# Patient Record
Sex: FEMALE | Race: WHITE | NOT HISPANIC OR LATINO | Employment: OTHER | URBAN - METROPOLITAN AREA
[De-identification: names, ages, dates, MRNs, and addresses within clinical notes are randomized per-mention and may not be internally consistent; named-entity substitution may affect disease eponyms.]

---

## 2018-04-10 ENCOUNTER — HISTORICAL (OUTPATIENT)
Dept: LAB | Facility: HOSPITAL | Age: 78
End: 2018-04-10

## 2018-04-14 LAB — FINAL CULTURE: NORMAL

## 2022-04-09 ENCOUNTER — HISTORICAL (OUTPATIENT)
Dept: ADMINISTRATIVE | Facility: HOSPITAL | Age: 82
End: 2022-04-09
Payer: MEDICARE

## 2022-04-29 VITALS
HEIGHT: 62 IN | DIASTOLIC BLOOD PRESSURE: 84 MMHG | OXYGEN SATURATION: 96 % | WEIGHT: 127 LBS | SYSTOLIC BLOOD PRESSURE: 132 MMHG | BODY MASS INDEX: 23.37 KG/M2

## 2022-05-05 ENCOUNTER — PATIENT OUTREACH (OUTPATIENT)
Dept: ADMINISTRATIVE | Facility: HOSPITAL | Age: 82
End: 2022-05-05
Payer: MEDICARE

## 2022-05-05 NOTE — PROGRESS NOTES
Pre-Visit Call   Since your last visit have you been hospitalized or treated in the emergency room or urgent care? NA  If yes: When, Where and Why? NA  Have you seen any other healthcare providers since your last visit with your Primary Care Provider? NA  If Yes: When and Who? NA  Tasks (Labs, Radiology, Medical Records)  Completed   Any Labs or Diagnostics since last visit? No    Quality Metrics Due   Cervical Cancer Screen: No  Colorectal Screen: No  Diabetes Eye Exam: No  Diabetes Hgb A1C: No  Diabetes Micro albumi: No    Types of Colorectal Screen      Additional Comments: Left message. Last MMG 4/23/14. Last bone density 4/23/14      Patient Reminders:  1. Bring Medication bottles with you to your appointment.   2. Take Blood pressure medicine at least one hour prior to appointment.

## 2022-05-25 ENCOUNTER — OFFICE VISIT (OUTPATIENT)
Dept: FAMILY MEDICINE | Facility: CLINIC | Age: 82
End: 2022-05-25
Payer: MEDICARE

## 2022-05-25 VITALS
OXYGEN SATURATION: 98 % | DIASTOLIC BLOOD PRESSURE: 78 MMHG | HEIGHT: 63 IN | SYSTOLIC BLOOD PRESSURE: 121 MMHG | BODY MASS INDEX: 20.98 KG/M2 | RESPIRATION RATE: 20 BRPM | HEART RATE: 66 BPM | WEIGHT: 118.38 LBS | TEMPERATURE: 98 F

## 2022-05-25 DIAGNOSIS — I10 PRIMARY HYPERTENSION: ICD-10-CM

## 2022-05-25 DIAGNOSIS — J30.2 SEASONAL ALLERGIES: ICD-10-CM

## 2022-05-25 DIAGNOSIS — Z13.31 POSITIVE DEPRESSION SCREENING: Primary | ICD-10-CM

## 2022-05-25 DIAGNOSIS — Z23 ENCOUNTER FOR ADMINISTRATION OF VACCINE: ICD-10-CM

## 2022-05-25 DIAGNOSIS — Z28.20 IMMUNIZATION NOT CARRIED OUT BECAUSE OF PATIENT DECISION: ICD-10-CM

## 2022-05-25 DIAGNOSIS — F33.41 RECURRENT MAJOR DEPRESSIVE DISORDER, IN PARTIAL REMISSION: ICD-10-CM

## 2022-05-25 DIAGNOSIS — Z00.00 MEDICARE ANNUAL WELLNESS VISIT, SUBSEQUENT: ICD-10-CM

## 2022-05-25 DIAGNOSIS — J41.0 SIMPLE CHRONIC BRONCHITIS: ICD-10-CM

## 2022-05-25 DIAGNOSIS — Z97.3 WEARS EYEGLASSES: ICD-10-CM

## 2022-05-25 DIAGNOSIS — Z71.89 ADVANCE DIRECTIVE DISCUSSED WITH PATIENT: ICD-10-CM

## 2022-05-25 DIAGNOSIS — M85.89 OSTEOPENIA OF MULTIPLE SITES: ICD-10-CM

## 2022-05-25 DIAGNOSIS — E78.2 MIXED HYPERLIPIDEMIA: ICD-10-CM

## 2022-05-25 DIAGNOSIS — K59.09 CHRONIC CONSTIPATION: ICD-10-CM

## 2022-05-25 PROBLEM — M85.80 OSTEOPENIA: Status: ACTIVE | Noted: 2022-05-25

## 2022-05-25 PROBLEM — J44.9 CHRONIC OBSTRUCTIVE PULMONARY DISEASE: Status: ACTIVE | Noted: 2022-05-25

## 2022-05-25 PROBLEM — E78.5 DYSLIPIDEMIA: Status: ACTIVE | Noted: 2022-05-25

## 2022-05-25 PROCEDURE — 3288F FALL RISK ASSESSMENT DOCD: CPT | Mod: CPTII,,, | Performed by: FAMILY MEDICINE

## 2022-05-25 PROCEDURE — 99497 ADVNCD CARE PLAN 30 MIN: CPT | Mod: ,,, | Performed by: FAMILY MEDICINE

## 2022-05-25 PROCEDURE — 1160F RVW MEDS BY RX/DR IN RCRD: CPT | Mod: CPTII,,, | Performed by: FAMILY MEDICINE

## 2022-05-25 PROCEDURE — 3077F PR MOST RECENT SYSTOLIC BLOOD PRESSURE >= 140 MM HG: ICD-10-PCS | Mod: CPTII,,, | Performed by: FAMILY MEDICINE

## 2022-05-25 PROCEDURE — 1126F PR PAIN SEVERITY QUANTIFIED, NO PAIN PRESENT: ICD-10-PCS | Mod: CPTII,,, | Performed by: FAMILY MEDICINE

## 2022-05-25 PROCEDURE — 1159F MED LIST DOCD IN RCRD: CPT | Mod: CPTII,,, | Performed by: FAMILY MEDICINE

## 2022-05-25 PROCEDURE — G0439 PR MEDICARE ANNUAL WELLNESS SUBSEQUENT VISIT: ICD-10-PCS | Mod: ,,, | Performed by: FAMILY MEDICINE

## 2022-05-25 PROCEDURE — 99497 PR ADVNCD CARE PLAN 30 MIN: ICD-10-PCS | Mod: ,,, | Performed by: FAMILY MEDICINE

## 2022-05-25 PROCEDURE — 1101F PT FALLS ASSESS-DOCD LE1/YR: CPT | Mod: CPTII,,, | Performed by: FAMILY MEDICINE

## 2022-05-25 PROCEDURE — 3079F DIAST BP 80-89 MM HG: CPT | Mod: CPTII,,, | Performed by: FAMILY MEDICINE

## 2022-05-25 PROCEDURE — 1126F AMNT PAIN NOTED NONE PRSNT: CPT | Mod: CPTII,,, | Performed by: FAMILY MEDICINE

## 2022-05-25 PROCEDURE — 3079F PR MOST RECENT DIASTOLIC BLOOD PRESSURE 80-89 MM HG: ICD-10-PCS | Mod: CPTII,,, | Performed by: FAMILY MEDICINE

## 2022-05-25 PROCEDURE — G0439 PPPS, SUBSEQ VISIT: HCPCS | Mod: ,,, | Performed by: FAMILY MEDICINE

## 2022-05-25 PROCEDURE — 3288F PR FALLS RISK ASSESSMENT DOCUMENTED: ICD-10-PCS | Mod: CPTII,,, | Performed by: FAMILY MEDICINE

## 2022-05-25 PROCEDURE — 1159F PR MEDICATION LIST DOCUMENTED IN MEDICAL RECORD: ICD-10-PCS | Mod: CPTII,,, | Performed by: FAMILY MEDICINE

## 2022-05-25 PROCEDURE — 1101F PR PT FALLS ASSESS DOC 0-1 FALLS W/OUT INJ PAST YR: ICD-10-PCS | Mod: CPTII,,, | Performed by: FAMILY MEDICINE

## 2022-05-25 PROCEDURE — 3077F SYST BP >= 140 MM HG: CPT | Mod: CPTII,,, | Performed by: FAMILY MEDICINE

## 2022-05-25 PROCEDURE — 1160F PR REVIEW ALL MEDS BY PRESCRIBER/CLIN PHARMACIST DOCUMENTED: ICD-10-PCS | Mod: CPTII,,, | Performed by: FAMILY MEDICINE

## 2022-05-25 RX ORDER — BUDESONIDE AND FORMOTEROL FUMARATE DIHYDRATE 160; 4.5 UG/1; UG/1
1 AEROSOL RESPIRATORY (INHALATION) 4 TIMES DAILY PRN
COMMUNITY
Start: 2022-05-02 | End: 2022-11-29 | Stop reason: SDUPTHER

## 2022-05-25 RX ORDER — LINACLOTIDE 145 UG/1
145 CAPSULE, GELATIN COATED ORAL DAILY
COMMUNITY
Start: 2022-03-22 | End: 2022-11-29 | Stop reason: SDUPTHER

## 2022-05-25 RX ORDER — CITALOPRAM 40 MG/1
40 TABLET, FILM COATED ORAL DAILY
COMMUNITY
Start: 2022-03-09 | End: 2022-11-29 | Stop reason: SDUPTHER

## 2022-05-25 RX ORDER — ATORVASTATIN CALCIUM 20 MG/1
20 TABLET, FILM COATED ORAL DAILY
COMMUNITY
Start: 2022-04-01 | End: 2022-11-29 | Stop reason: SDUPTHER

## 2022-05-25 NOTE — PROGRESS NOTES
Patient Name: Elizabeth Navarrete     Date of service:  5/25/22      Member ID: [id number] - (Managed Medicare)    YOB: 1940   Gender: female   Race: White      Ethnicity: Not  or /a   Medical Record Number: [id number]     Reason for Visit:   Chief Complaint   Patient presents with    Medicare AWV     No labs done      History of Present Illness: 83 yo WF in NAD, here for AWV without complaints.       Past Medical History:   Diagnosis Date    Asthma     Depression     Hyperlipidemia     IBS (irritable bowel syndrome)       Providers regularly involved with care (specialists/suppliers):   Patient Care Team:  Gerald Jean-Baptiste MD as PCP - General (Hospitalist)   Past Surgical History:   Procedure Laterality Date    BLADDER SUSPENSION      HYSTERECTOMY        Medication reconciliation completed.    Current Outpatient Medications   Medication Sig    atorvastatin (LIPITOR) 20 MG tablet Take 20 mg by mouth once daily.    citalopram (CELEXA) 40 MG tablet Take 40 mg by mouth once daily.    LINZESS 145 mcg Cap capsule Take 145 mcg by mouth once daily.    SYMBICORT 160-4.5 mcg/actuation HFAA Inhale 1 puff into the lungs 4 (four) times daily as needed.     No current facility-administered medications for this visit.      There are no discontinued medications.     Review of patient's allergies indicates:  No Known Allergies  Social History     Socioeconomic History    Marital status:     Number of children: 4   Tobacco Use    Smoking status: Never Smoker    Smokeless tobacco: Never Used   Substance and Sexual Activity    Alcohol use: Not Currently    Drug use: Never      Smoking Cessation:  Counseling given: Not Answered     Non-smoker     Diet: normal diet  High-risk lifestyle(s): none    Family History   Problem Relation Age of Onset    No Known Problems Mother     Cancer Father       Review of Systems:  Review of Systems   All other systems reviewed and are negative.     Vitals:  "   05/25/22 0957   BP: (!) 146/88   Pulse: 70   Resp: 20   Temp: 98.2 °F (36.8 °C)   TempSrc: Oral   SpO2: 98%   Weight: 53.7 kg (118 lb 6.4 oz)   Height: 5' 3" (1.6 m)   PainSc: 0-No pain      Body mass index is 20.97 kg/m².   Physical Exam:  Physical Exam  Vitals and nursing note reviewed.   Constitutional:       Appearance: Normal appearance. She is normal weight.   HENT:      Head: Normocephalic and atraumatic.      Right Ear: Tympanic membrane, ear canal and external ear normal.      Left Ear: Tympanic membrane, ear canal and external ear normal.      Nose: Nose normal.      Mouth/Throat:      Mouth: Mucous membranes are moist.      Pharynx: Oropharynx is clear.   Eyes:      Extraocular Movements: Extraocular movements intact.      Conjunctiva/sclera: Conjunctivae normal.      Pupils: Pupils are equal, round, and reactive to light.   Cardiovascular:      Rate and Rhythm: Normal rate and regular rhythm.      Pulses: Normal pulses.      Heart sounds: Normal heart sounds.   Pulmonary:      Effort: Pulmonary effort is normal.      Breath sounds: Normal breath sounds.   Abdominal:      General: Abdomen is flat. Bowel sounds are normal.      Palpations: Abdomen is soft.   Musculoskeletal:         General: Normal range of motion.      Cervical back: Normal range of motion and neck supple.   Skin:     General: Skin is warm and dry.   Neurological:      General: No focal deficit present.      Mental Status: She is alert and oriented to person, place, and time. Mental status is at baseline.   Psychiatric:         Mood and Affect: Mood normal.         Behavior: Behavior normal.         Thought Content: Thought content normal.         Judgment: Judgment normal.        How is your physical exam and mental status from last year? unchanged    Health Maintenance Complete:  The patient has no Health Maintenance topics of status Not Due   CANCER SCREENING    Breast Cancer Screening:  Screening not applicable - has aged past " screening recommendation    Prostate Cancer Screening:   PSA not applicable due to patient sex  No results found for: PSA, PSADIAG    Cervical Cancer Screening:  The patient has the following risk factor(s) for Cervical Cancer: has aged past screening recommendation    Colorectal Cancer Screening:   patient declined screening at this time - has aged past screening recommendation    Lung Cancer Screening:   The patient has the following risk factor(s) for Lung Cancer: patient declined screening at this time - no risk factors and has aged past screening recommendations  Social History     Tobacco Use   Smoking Status Never Smoker   Smokeless Tobacco Never Used        DISEASE-SPECIFIC MANAGEMENT - please fill in all appropriate dates for screening received; only ONE is needed to meet HEDIS measures under each section.    Diabetic nephropathy: screening not applicable  No results found for: ALBUMIN, LABMICR, CREATRANDUR, MICALBCREAT    Diabetic eye care: Screening not applicable    Diabetic foot exam: screening not applicable    Labs: Lab not applicable  No results found for: HGBA1C, HGBA1C     Immunizations:  Immunization History   Administered Date(s) Administered    COVID-19, vector-nr, rS-Ad26, PF ([facility]edyCasa) 03/05/2021, 03/05/2021    Influenza 10/15/2012    Influenza - High Dose - PF (65 years and older) 10/16/2015, 11/11/2016, 10/31/2019    Influenza - Quadrivalent - PF *Preferred* (6 months and older) 10/05/1998, 11/26/2002, 12/09/2003, 11/22/2004, 11/05/2008, 11/16/2009, 11/01/2011    Influenza - Trivalent - PF (ADULT) 10/05/1998, 11/26/2002, 12/09/2003, 11/22/2004, 11/05/2008, 11/16/2009, 11/01/2011, 10/24/2013, 11/08/2021    Pneumococcal Polysaccharide - 23 Valent 10/05/1998, 11/22/2004    Tdap 11/22/2004      Osteoporosis management in women who had a fracture: screening not applicable - Osteopenia, record/results from outside source requested    Osteoporosis Testing in Women (65-85 years old)  The  "patient has the following risk factor(s) for Bone Mineral Density: last DEXA > 1 yr  No results found for this or any previous visit.    Abdominal Aortic Aneurysm Screening:  The patient has the following risk factor(s) for an Abdominal Aortic Aneurysm: no risk factors for this patient  No results found for this or any previous visit.    Cardiovascular Screening:  The patient has the following risk factor(s) for Cardiovascular Disease: patient's blood pressure is uncontrolled and currently on lipid lowering medications  No results found for: CHOL, TRIG, HDL, LDLCALC, CHOLHDL, TOTALCHOLEST, NONHDLCHOL   Hepatitis C Virus Screening:  The patient has the following risk factor(s) for Hepatitis C: - N/A   No results found for: HEPCAB    SCREENING ASSESSMENTS  Pain screening. Level of pain patient is in on a daily basis  0   [] If pain, check box if evidence of pain management.    The following assessments were completed and results are noted below:  Timed Get Up and Go - 6 sec  Depression screening - 4 (already has Dx and takes medications)  Whisper Test - hears normally  Nutrition screening - normal diet  Cognitive function screening - see below  ADLs/Functional status assessment - independent, cares for   Physical Activity Questionnaire (PAQ) - ambulatory without aids  Functional/Cognitive Status: Disability Status - functional, independent, does not drive    Assessment Result Summary:     Summary      Get Up and Go - 6 sec    PHQ-2 Total Score: 4    Nutrition  -- normal diet      Cognitive Function Screening      MMSE  5 What time is it? (year, season, month, day, date)  5 Where are we? (State, county, town, hospital, floor)  3 3 object test.  5 serial 7s or world backwards  3  Objects above  2 point to two objects and ask for name  1 Repeat "no ifs ands or buts"  1 Write a sentence  1 Copy a design  3 Follow three step command  1 Read and obey the following: Close your eyes    Score - 19 - mild-moderate " cognitive dysfunction    Functional/Cognitive Status:  Independent, Cares for         Other assessments:  Fall risk assessment:   Fall Risk Assessment - Outpatient 5/25/2022   Mobility Status Ambulatory   Number of falls 0   Identified as fall risk 0      Urinary incontinence: Patient is not having problems with urinary incontinence  Medication review performed for potentially harmful drug-disease interactions in the elderly.  Patient is under the care of /Care Manager: no. Referral sent: no.    Advanced directives (Living Will)  During this visit, I engaged the patient in the advance care planning process.  The patient and I reviewed the role for advance directives and their purpose in directing future healthcare if the patients unable to speak for him/herself.  At this point in time, the patient does have full decision-making capacity. We discussed different extreme health states that she could experience, and reviewed what kind of medical care she would want in those situations. The patient communicated that if she were comatose and had little chance of a meaningful recovery, she would not want machines/life-sustaining treatments used.  The patient has not completed a living will to reflect these preferences.  The patient has not already designated a healthcare power of  to make decisions on her behalf. I spent a total of 5 minutes engaging the patient in this advance care planning discussion.    Diagnoses with ICD-10 code:    ICD-10-CM ICD-9-CM   1. Positive depression screening  Z13.31 796.4   2. Medicare annual wellness visit, subsequent  Z00.00 V70.0   3. Advance directive discussed with patient  Z71.89 V65.49   4. Primary hypertension  I10 401.9   5. Mixed hyperlipidemia  E78.2 272.2   6. Chronic constipation  K59.09 564.00   7. Simple chronic bronchitis  J41.0 491.0   8. Seasonal allergies  J30.2 477.9   9. Wears eyeglasses  Z97.3 V49.89   10. Encounter for administration of  vaccine  Z23 V05.9   11. Immunization not carried out because of patient decision  Z28.20 V64.09   12. Osteopenia of multiple sites  M85.89 733.90   13. Recurrent major depressive disorder, in partial remission  F33.41 296.35      Diagnoses with associated orders:  Problem List Items Addressed This Visit     Medicare annual wellness visit, subsequent    Relevant Orders    CBC Auto Differential    Comprehensive Metabolic Panel    Lipid Panel    Urinalysis, Reflex to Urine Culture Urine, Clean Catch    Chronic constipation    Chronic obstructive pulmonary disease    Hypertension    Relevant Orders    CBC Auto Differential    Comprehensive Metabolic Panel    Lipid Panel    Urinalysis, Reflex to Urine Culture Urine, Clean Catch    Mixed hyperlipidemia    Relevant Orders    Comprehensive Metabolic Panel    Lipid Panel    Immunization not carried out because of patient decision    Osteopenia    Recurrent major depressive disorder, in partial remission    Seasonal allergies    Wears eyeglasses    Encounter for administration of vaccine    Advance directive discussed with patient      Other Visit Diagnoses     Positive depression screening    -  Primary    I have reviewed the positive depression score which warrants active treatment with psychotherapy and/or medications.        Plan:  1.  Continue current medications  2.  Side effects and expected results discussed  3.  Diet and exercise as tolerated  4.  Nutritional support  5.  Health maintenance updated accordingly  6.  Call with increased complaints or concerns  7.  Refills as needed  8.  Surveillance labs for chronic medical conditions as ordered  9.  RTC 6 months for chronic condition surveillance visit   10. Declines vaccines  11. Declines additional screening     Screening/prevention plan for the next 10 years:  Goal of exercise is 150 minutes a week.   Encouraged to follow balanced diet with daily servings of fresh fruit and vegetables.  Make sure to schedule all  health maintenance appointments to achieve health care goals.   Annual check up is due every 12 months with your designated provider/care team.  Health Maintenance Due   Topic Date Due    Lipid Panel  Never done    DEXA Scan  Never done    Shingles Vaccine (1 of 2) Never done    Pneumococcal Vaccines (Age 65+) (2 - PCV) 11/22/2005    TETANUS VACCINE  11/22/2014    COVID-19 Vaccine (2 - Booster for Cole series) 04/30/2021      Follow-up: Follow up in about 6 months (around 11/25/2022), or 6 months and/or PRN, for RecRequ: DEXA (if available).   Patient Instructions:  There are no Patient Instructions on file for this visit.       I have used clinical judgement based on duration and functional status to consider definite necessity for treatment. Already taking citalopram.

## 2022-06-17 ENCOUNTER — HOSPITAL ENCOUNTER (EMERGENCY)
Facility: HOSPITAL | Age: 82
Discharge: HOME | End: 2022-06-17
Attending: STUDENT IN AN ORGANIZED HEALTH CARE EDUCATION/TRAINING PROGRAM
Payer: MEDICARE

## 2022-06-17 VITALS
WEIGHT: 140 LBS | HEIGHT: 66 IN | OXYGEN SATURATION: 96 % | RESPIRATION RATE: 16 BRPM | SYSTOLIC BLOOD PRESSURE: 132 MMHG | DIASTOLIC BLOOD PRESSURE: 79 MMHG | BODY MASS INDEX: 22.5 KG/M2 | TEMPERATURE: 97 F | HEART RATE: 68 BPM

## 2022-06-17 DIAGNOSIS — E86.0 DEHYDRATION: ICD-10-CM

## 2022-06-17 DIAGNOSIS — R53.1 WEAKNESS: ICD-10-CM

## 2022-06-17 DIAGNOSIS — N39.0 URINARY TRACT INFECTION WITHOUT HEMATURIA, SITE UNSPECIFIED: ICD-10-CM
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
## 2022-06-17 DIAGNOSIS — R42 DIZZINESS: Primary | ICD-10-CM

## 2022-06-17 LAB
ALBUMIN SERPL-MCNC: 4 GM/DL (ref 3.4–4.8)
ALBUMIN/GLOB SERPL: 1.3 RATIO (ref 1.1–2)
ALP SERPL-CCNC: 84 UNIT/L (ref 40–150)
ALT SERPL-CCNC: 11 UNIT/L (ref 0–55)
APPEARANCE UR: CLEAR
AST SERPL-CCNC: 20 UNIT/L (ref 5–34)
BASOPHILS # BLD AUTO: 0.04 X10(3)/MCL (ref 0–0.2)
BASOPHILS NFR BLD AUTO: 0.3 %
BILIRUB UR QL STRIP.AUTO: NEGATIVE MG/DL
BILIRUBIN DIRECT+TOT PNL SERPL-MCNC: 0.6 MG/DL
BUN SERPL-MCNC: 19.6 MG/DL (ref 9.8–20.1)
CALCIUM SERPL-MCNC: 9.9 MG/DL (ref 8.4–10.2)
CHLORIDE SERPL-SCNC: 98 MMOL/L (ref 98–107)
CO2 SERPL-SCNC: 26 MMOL/L (ref 23–31)
COLOR UR AUTO: YELLOW
CREAT SERPL-MCNC: 0.99 MG/DL (ref 0.55–1.02)
EOSINOPHIL # BLD AUTO: 0 X10(3)/MCL (ref 0–0.9)
EOSINOPHIL NFR BLD AUTO: 0 %
ERYTHROCYTE [DISTWIDTH] IN BLOOD BY AUTOMATED COUNT: 12.8 % (ref 11.5–17)
GLOBULIN SER-MCNC: 3 GM/DL (ref 2.4–3.5)
GLUCOSE SERPL-MCNC: 124 MG/DL (ref 82–115)
GLUCOSE UR QL STRIP.AUTO: NEGATIVE MG/DL
HCT VFR BLD AUTO: 44.3 % (ref 37–47)
HGB BLD-MCNC: 15.1 GM/DL (ref 12–16)
IMM GRANULOCYTES # BLD AUTO: 0.08 X10(3)/MCL (ref 0–0.02)
IMM GRANULOCYTES NFR BLD AUTO: 0.6 % (ref 0–0.43)
KETONES UR QL STRIP.AUTO: ABNORMAL MG/DL
LEUKOCYTE ESTERASE UR QL STRIP.AUTO: ABNORMAL UNIT/L
LYMPHOCYTES # BLD AUTO: 0.46 X10(3)/MCL (ref 0.6–4.6)
LYMPHOCYTES NFR BLD AUTO: 3.3 %
MCH RBC QN AUTO: 29.7 PG (ref 27–31)
MCHC RBC AUTO-ENTMCNC: 34.1 MG/DL (ref 33–36)
MCV RBC AUTO: 87.2 FL (ref 80–94)
MONOCYTES # BLD AUTO: 0.56 X10(3)/MCL (ref 0.1–1.3)
MONOCYTES NFR BLD AUTO: 4 %
NEUTROPHILS # BLD AUTO: 13 X10(3)/MCL (ref 2.1–9.2)
NEUTROPHILS NFR BLD AUTO: 91.8 %
NITRITE UR QL STRIP.AUTO: NEGATIVE
NRBC BLD AUTO-RTO: 0 %
PH UR STRIP.AUTO: 5.5 [PH]
PLATELET # BLD AUTO: 193 X10(3)/MCL (ref 130–400)
PMV BLD AUTO: 10.4 FL (ref 9.4–12.4)
POTASSIUM SERPL-SCNC: 3.7 MMOL/L (ref 3.5–5.1)
PROT SERPL-MCNC: 7 GM/DL (ref 5.8–7.6)
PROT UR QL STRIP.AUTO: ABNORMAL MG/DL
RBC # BLD AUTO: 5.08 X10(6)/MCL (ref 4.2–5.4)
RBC UR QL AUTO: ABNORMAL UNIT/L
SODIUM SERPL-SCNC: 137 MMOL/L (ref 136–145)
SP GR UR STRIP.AUTO: 1.02 (ref 1–1.03)
TROPONIN I SERPL-MCNC: <0.01 NG/ML (ref 0–0.04)
UROBILINOGEN UR STRIP-ACNC: 1 MG/DL
WBC # SPEC AUTO: 14.2 X10(3)/MCL (ref 4.5–11.5)

## 2022-06-17 PROCEDURE — 99285 EMERGENCY DEPT VISIT HI MDM: CPT | Mod: 25

## 2022-06-17 PROCEDURE — 93005 ELECTROCARDIOGRAM TRACING: CPT

## 2022-06-17 PROCEDURE — 82962 GLUCOSE BLOOD TEST: CPT

## 2022-06-17 PROCEDURE — 96374 THER/PROPH/DIAG INJ IV PUSH: CPT

## 2022-06-17 PROCEDURE — 63600175 PHARM REV CODE 636 W HCPCS: Performed by: PHYSICIAN ASSISTANT

## 2022-06-17 PROCEDURE — 36415 COLL VENOUS BLD VENIPUNCTURE: CPT | Performed by: STUDENT IN AN ORGANIZED HEALTH CARE EDUCATION/TRAINING PROGRAM

## 2022-06-17 PROCEDURE — 96361 HYDRATE IV INFUSION ADD-ON: CPT

## 2022-06-17 PROCEDURE — 25000003 PHARM REV CODE 250: Performed by: STUDENT IN AN ORGANIZED HEALTH CARE EDUCATION/TRAINING PROGRAM

## 2022-06-17 PROCEDURE — 84484 ASSAY OF TROPONIN QUANT: CPT | Performed by: PHYSICIAN ASSISTANT

## 2022-06-17 PROCEDURE — 81001 URINALYSIS AUTO W/SCOPE: CPT | Performed by: PHYSICIAN ASSISTANT

## 2022-06-17 PROCEDURE — 80053 COMPREHEN METABOLIC PANEL: CPT | Performed by: STUDENT IN AN ORGANIZED HEALTH CARE EDUCATION/TRAINING PROGRAM

## 2022-06-17 PROCEDURE — 85025 COMPLETE CBC W/AUTO DIFF WBC: CPT | Performed by: STUDENT IN AN ORGANIZED HEALTH CARE EDUCATION/TRAINING PROGRAM

## 2022-06-17 RX ORDER — NITROFURANTOIN 25; 75 MG/1; MG/1
100 CAPSULE ORAL 2 TIMES DAILY
Qty: 10 CAPSULE | Refills: 0 | Status: SHIPPED | OUTPATIENT
Start: 2022-06-17 | End: 2022-06-22

## 2022-06-17 RX ORDER — ONDANSETRON 2 MG/ML
4 INJECTION INTRAMUSCULAR; INTRAVENOUS
Status: COMPLETED | OUTPATIENT
Start: 2022-06-17 | End: 2022-06-17

## 2022-06-17 RX ORDER — NITROFURANTOIN 25; 75 MG/1; MG/1
100 CAPSULE ORAL 2 TIMES DAILY
Qty: 10 CAPSULE | Refills: 0 | Status: SHIPPED | OUTPATIENT
Start: 2022-06-17 | End: 2022-06-17 | Stop reason: SDUPTHER

## 2022-06-17 RX ADMIN — SODIUM CHLORIDE 1000 ML: 9 INJECTION, SOLUTION INTRAVENOUS at 07:06

## 2022-06-17 RX ADMIN — ONDANSETRON 4 MG: 2 INJECTION INTRAMUSCULAR; INTRAVENOUS at 06:06

## 2022-06-17 NOTE — ED TRIAGE NOTES
(C/o dizziness/fall today,  not on thinners. Hx vertigo. Pt had large BM on scene and vomited en route. Pt placed in ccollar, denies chest pain.

## 2022-06-17 NOTE — ED PROVIDER NOTES
Encounter Date: 6/17/2022    SCRIBE #1 NOTE: I, Michael Yasemin, am scribing for, and in the presence of,  Ranjith Vora IV, MD. I have scribed the following portions of the note - Other sections scribed: HPI, ROS, PE, EKG.       History     Chief Complaint   Patient presents with    Dizziness     C/o dizziness/fall today, denies hitting head or LOC, not on thinners. Hx vertigo. Pt had large BM on scene and vomited en route.      An 83 y/o female with a history of Vertigo, HLD, IBS, and Asthma presents to [facility] ED after a fall from earlier today. Per pt's daughter, the pt has had confusion, nausea, dizziness upon sitting up. Vomited on scene. Denies blood thinners, unknown LOC. Per pt's daughter, the pt's fall was not witnessed,   heard thump and came running into room. At this time, no complaints, denies headaches, chest pain, abdominal pain, fevers, chills    The history is provided by the patient.   Fall  The accident occurred today. The fall occurred in unknown circumstances. She fell from an unknown height. She landed on a hard floor. Point of impact: unknown. The pain is at a severity of 0/10. She was not ambulatory at the scene. There was no drug use involved in the accident. There was no alcohol use involved in the accident. Pertinent negatives include no neck pain, no back pain, no fever, no abdominal pain, no nausea, no vomiting, no hematuria and no headaches.     Review of patient's allergies indicates:  No Known Allergies  Past Medical History:   Diagnosis Date    Asthma     Depression     Hyperlipidemia     IBS (irritable bowel syndrome)      Past Surgical History:   Procedure Laterality Date    BLADDER SUSPENSION      HYSTERECTOMY       Family History   Problem Relation Age of Onset    No Known Problems Mother     Cancer Father      Social History     Tobacco Use    Smoking status: Never Smoker    Smokeless tobacco: Never Used   Substance Use Topics    Alcohol use: Not Currently     Drug use: Never     Review of Systems   Constitutional: Negative for chills and fever.   HENT: Negative for congestion, rhinorrhea and sore throat.    Eyes: Negative for visual disturbance.   Respiratory: Negative for cough and shortness of breath.    Cardiovascular: Negative for chest pain and leg swelling.   Gastrointestinal: Negative for abdominal pain, nausea and vomiting.   Genitourinary: Negative for dysuria, hematuria, vaginal bleeding and vaginal discharge.   Musculoskeletal: Negative for back pain, joint swelling and neck pain.   Skin: Negative for rash.   Neurological: Positive for dizziness (upon sitting up). Negative for weakness and headaches.   Psychiatric/Behavioral: Negative for confusion.       Physical Exam     Initial Vitals [06/17/22 1447]   BP Pulse Resp Temp SpO2   131/79 70 18 96.8 °F (36 °C) 98 %      MAP       --         Physical Exam    Nursing note and vitals reviewed.  Constitutional: She is not diaphoretic. No distress.   HENT:   Head: Normocephalic and atraumatic.   Posterior scalp hematoma   Eyes: EOM are normal. Pupils are equal, round, and reactive to light.   Neck: Neck supple.   Normal range of motion.  Cardiovascular: Normal rate and regular rhythm.   No murmur heard.  Pulmonary/Chest: Breath sounds normal. No respiratory distress. She has no wheezes. She has no rales.   Abdominal: Abdomen is soft. She exhibits no distension. There is no abdominal tenderness.   Musculoskeletal:         General: Normal range of motion.      Cervical back: Normal range of motion and neck supple.     Neurological: She is alert and oriented to person, place, and time. She has normal strength.   Skin: Skin is warm. No rash noted.   Psychiatric: She has a normal mood and affect.         ED Course   Procedures  Labs Reviewed   COMPREHENSIVE METABOLIC PANEL - Abnormal; Notable for the following components:       Result Value    Glucose Level 124 (*)     All other components within normal limits   CBC  WITH DIFFERENTIAL - Abnormal; Notable for the following components:    WBC 14.2 (*)     Lymph # 0.46 (*)     Neut # 13.0 (*)     IG# 0.08 (*)     IG% 0.6 (*)     All other components within normal limits   URINALYSIS, REFLEX TO URINE CULTURE - Abnormal; Notable for the following components:    Protein, UA Trace (*)     Ketones, UA Trace (*)     Blood, UA 1+ (*)     Leukocyte Esterase, UA 2+ (*)     All other components within normal limits   URINALYSIS, MICROSCOPIC - Abnormal; Notable for the following components:    RBC, UA 6 (*)     WBC, UA 35 (*)     Mucous, UA Small (*)     All other components within normal limits   TROPONIN I - Normal   CULTURE, URINE   CBC W/ AUTO DIFFERENTIAL    Narrative:     The following orders were created for panel order CBC auto differential.  Procedure                               Abnormality         Status                     ---------                               -----------         ------                     CBC with Differential[[id number]]        Abnormal            Final result                 Please view results for these tests on the individual orders.   POCT GLUCOSE MONITORING CONTINUOUS     EKG Readings: (Independently Interpreted)   Rhythm: Normal Sinus Rhythm. Heart Rate: 77. Ectopy: No Ectopy. Conduction: RBBB. ST Segments: Normal ST Segments. T Waves Flipped: V2 and V3. Axis: Normal.   Time: 1828     ECG Results          EKG and show to ED MD (In process)  Result time 06/17/22 20:04:59    In process by Interface, Lab In [facility] (06/17/22 20:04:59)                 Narrative:    Test Reason : R42,    Vent. Rate : 077 BPM     Atrial Rate : 077 BPM     P-R Int : 140 ms          QRS Dur : 116 ms      QT Int : 422 ms       P-R-T Axes : 088 076 -15 degrees     QTc Int : 477 ms    Normal sinus rhythm  Right bundle branch block  T wave abnormality, consider inferolateral ischemia  Abnormal ECG  No previous ECGs available    Referred By: [email or web address]   SELF           Confirmed By:                              Imaging Results          X-Ray Chest 1 View (Final result)  Result time 06/17/22 19:45:21    Final result by Cam Romero MD (06/17/22 19:45:21)                 Impression:      NO ACUTE CARDIOPULMONARY PROCESS IDENTIFIED.      Electronically signed by: Cam Romero  Date:    06/17/2022  Time:    19:45             Narrative:    EXAMINATION:  XR CHEST 1 VIEW    CLINICAL HISTORY:  Weakness    TECHNIQUE:  One view    COMPARISON:  November 29, 2018.    FINDINGS:  Cardiopericardial silhouette is within normal limits. Lungs are without dense focal or segmental consolidation, congestive process, pleural effusions or pneumothorax.                               CT Cervical Spine Without Contrast (Final result)  Result time 06/17/22 19:12:09    Final result by Cam Romero MD (06/17/22 19:12:09)                 Impression:      No acute fracture or malalignment identified.      Electronically signed by: Cam Romero  Date:    06/17/2022  Time:    19:12             Narrative:    EXAMINATION:  CT CERVICAL SPINE WITHOUT CONTRAST    CLINICAL HISTORY:  Trauma.    TECHNIQUE:  Multidetector axial images were performed of the cervical spine without and.  Images were reconstructed.    Automated exposure control was utilized to minimize radiation dose.  DLP 1185.    COMPARISON:    None available.    FINDINGS:  There is grade 1 anterolisthesis of C4 on C5 which is degenerative is, cervical vertebrae stature alignment is preserved.  No acute fracture or malalignment identified.  There are degenerative changes which cause ventral impression upon the thecal sac and narrowings of the neural foramen.  There is no prevertebral soft tissue prominence.    This study does not exclude the possibility of intrathecal soft tissue, ligamentous or vascular injury.                               CT Head Without Contrast (Final result)  Result time 06/17/22 19:08:31    Final result by Cam Romero MD (06/17/22  19:08:31)                 Impression:      No acute intracranial traumatic findings identified.      Electronically signed by: Cam Romero  Date:    06/17/2022  Time:    19:08             Narrative:    EXAMINATION:  CT HEAD WITHOUT CONTRAST    CLINICAL HISTORY:  fall, dizziness, vomiting;    TECHNIQUE:  Sequential axial images were performed of the brain without contrast.    Dose product length of 1185 mGycm. Automated exposure control was utilized to minimize radiation dose.    COMPARISON:  None available.    FINDINGS:  There is no intracranial mass effect, midline shift, hydrocephalus or hemorrhage. There is no sulcal effacement or low attenuation changes to suggest recent large vessel territory infarction. Chronic appearing periventricular and subcortical white matter low attenuation changes are present and are consistent with chronic microangiopathic ischemia. The ventricular system and sulcal markings prominence is consistent with atrophy. There is no acute extra axial fluid collection.  There is no acute depressed calvarial fracture.    Bilateral with thickening of the maxillary and ethmoidal air cells.  Mastoid air cells aeration is optimal.                              X-Rays:   Independently Interpreted Readings:   Chest X-Ray: No acute abnormalities.   Head CT: No hemorrhage.     Medications   ondansetron injection 4 mg (4 mg Intravenous Given 6/17/22 1815)   sodium chloride 0.9% bolus 1,000 mL (0 mLs Intravenous Stopped 6/17/22 2000)     Medical Decision Making:   History:   I obtained history from: someone other than patient.  Old Medical Records: I decided to obtain old medical records.  Initial Assessment:   Dizzy, hx of vertigo, fell at home. Orthostatics positive here. Trauma workup unremarkable. Hydrated, UA with UTI. WIll treat, discharge with daughter.   Differential Diagnosis:   Dizziness, vertigo, dehydration, ICH, ACS  Independently Interpreted Test(s):   I have ordered and independently  interpreted EKG Reading(s) - see prior notes  Clinical Tests:   Lab Tests: Ordered and Reviewed  Radiological Study: Ordered and Reviewed  Medical Tests: Ordered and Reviewed  ED Management:  IVF            Scribe Attestation:   Scribe #1: I performed the above scribed service and the documentation accurately describes the services I performed. I attest to the accuracy of the note.    Attending Attestation:           Physician Attestation for Scribe:  Physician Attestation Statement for Scribe #1: I, Ranjith Vora IV, MD, reviewed documentation, as scribed by Michael Hazel in my presence, and it is both accurate and complete.             ED Course as of 06/18/22 0127   Fri Jun 17, 2022   2334 Labs with UTI - will treat, discharge with daughter [AC]   2335 Urine culture sent, other workup unremarkable.  [AC]      ED Course User Index  [AC] Ranjith Vora IV, MD             Clinical Impression:   Final diagnoses:  [R42] Dizziness (Primary)  [R53.1] Weakness  [W19.XXXA] Fall, initial encounter  [N39.0] Urinary tract infection without hematuria, site unspecified  [E86.0] Dehydration          ED Disposition Condition    Discharge Stable        ED Prescriptions     Medication Sig Dispense Start Date End Date Auth. Provider    nitrofurantoin, macrocrystal-monohydrate, (MACROBID) 100 MG capsule  (Status: Discontinued) Take 1 capsule (100 mg total) by mouth 2 (two) times daily. for 5 days 10 capsule 6/17/2022 6/17/2022 Ranjith Vora IV, MD    nitrofurantoin, macrocrystal-monohydrate, (MACROBID) 100 MG capsule Take 1 capsule (100 mg total) by mouth 2 (two) times daily. for 5 days 10 capsule 6/17/2022 6/22/2022 Ranjith Vora IV, MD        Follow-up Information     Follow up With Specialties Details Why Contact Info    Gerald Jean-Baptiste MD Family Medicine Schedule an appointment as soon as possible for a visit in 1 day  [address]  [facility] [address]  [phone]      [facility] -  Emergency Dept Emergency Medicine Go to  If symptoms worsen [address] [address]  [phone]      IRanjith MD personally performed the history, PE, MDM, and procedures as documented above and agree with the scribe's documentation.        Ranjith Vora IV, MD  06/18/22 0127

## 2022-06-18 ENCOUNTER — HOSPITAL ENCOUNTER (OUTPATIENT)
Facility: HOSPITAL | Age: 82
Discharge: HOME | End: 2022-06-20
Attending: EMERGENCY MEDICINE | Admitting: INTERNAL MEDICINE
Payer: MEDICARE

## 2022-06-18 DIAGNOSIS — R07.9 CHEST PAIN: ICD-10-CM

## 2022-06-18 DIAGNOSIS — R53.1 WEAKNESS: ICD-10-CM

## 2022-06-18 DIAGNOSIS — R53.1 GENERAL WEAKNESS: ICD-10-CM

## 2022-06-18 DIAGNOSIS — R11.2 INTRACTABLE NAUSEA AND VOMITING: Primary | ICD-10-CM

## 2022-06-18 LAB
BACTERIA #/AREA URNS AUTO: ABNORMAL /HPF
MUCOUS THREADS URNS QL MICRO: ABNORMAL /LPF
RBC #/AREA URNS AUTO: 6 /HPF
SQUAMOUS #/AREA URNS AUTO: <4 /HPF
WBC #/AREA URNS AUTO: 35 /HPF

## 2022-06-18 PROCEDURE — 99285 EMERGENCY DEPT VISIT HI MDM: CPT | Mod: 25,CS

## 2022-06-19 PROBLEM — R11.0 NAUSEA: Status: ACTIVE | Noted: 2022-06-19

## 2022-06-19 PROBLEM — R42 DIZZINESS: Status: ACTIVE | Noted: 2022-06-19
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
## 2022-06-19 LAB
ALBUMIN SERPL-MCNC: 3.7 GM/DL (ref 3.4–4.8)
ALBUMIN/GLOB SERPL: 1.2 RATIO (ref 1.1–2)
ALP SERPL-CCNC: 73 UNIT/L (ref 40–150)
ALT SERPL-CCNC: 10 UNIT/L (ref 0–55)
APPEARANCE UR: CLEAR
AST SERPL-CCNC: 23 UNIT/L (ref 5–34)
BACTERIA #/AREA URNS AUTO: ABNORMAL /HPF
BACTERIA UR CULT: ABNORMAL
BASOPHILS # BLD AUTO: 0.06 X10(3)/MCL (ref 0–0.2)
BASOPHILS NFR BLD AUTO: 0.9 %
BILIRUB UR QL STRIP.AUTO: NEGATIVE MG/DL
BILIRUBIN DIRECT+TOT PNL SERPL-MCNC: 0.8 MG/DL
BNP BLD-MCNC: 128.1 PG/ML
BUN SERPL-MCNC: 13.9 MG/DL (ref 9.8–20.1)
CALCIUM SERPL-MCNC: 9.3 MG/DL (ref 8.4–10.2)
CHLORIDE SERPL-SCNC: 102 MMOL/L (ref 98–107)
CO2 SERPL-SCNC: 25 MMOL/L (ref 23–31)
COLOR UR AUTO: YELLOW
CREAT SERPL-MCNC: 0.75 MG/DL (ref 0.55–1.02)
EOSINOPHIL # BLD AUTO: 0 X10(3)/MCL (ref 0–0.9)
EOSINOPHIL NFR BLD AUTO: 0 %
ERYTHROCYTE [DISTWIDTH] IN BLOOD BY AUTOMATED COUNT: 12.6 % (ref 11.5–17)
GLOBULIN SER-MCNC: 3.2 GM/DL (ref 2.4–3.5)
GLUCOSE SERPL-MCNC: 102 MG/DL (ref 82–115)
GLUCOSE UR QL STRIP.AUTO: NEGATIVE MG/DL
HCT VFR BLD AUTO: 41.9 % (ref 37–47)
HGB BLD-MCNC: 14.2 GM/DL (ref 12–16)
IMM GRANULOCYTES # BLD AUTO: 0.01 X10(3)/MCL (ref 0–0.02)
IMM GRANULOCYTES NFR BLD AUTO: 0.2 % (ref 0–0.43)
KETONES UR QL STRIP.AUTO: ABNORMAL MG/DL
LEUKOCYTE ESTERASE UR QL STRIP.AUTO: NEGATIVE UNIT/L
LYMPHOCYTES # BLD AUTO: 0.78 X10(3)/MCL (ref 0.6–4.6)
LYMPHOCYTES NFR BLD AUTO: 12.3 %
MCH RBC QN AUTO: 29.8 PG (ref 27–31)
MCHC RBC AUTO-ENTMCNC: 33.9 MG/DL (ref 33–36)
MCV RBC AUTO: 87.8 FL (ref 80–94)
MONOCYTES # BLD AUTO: 0.29 X10(3)/MCL (ref 0.1–1.3)
MONOCYTES NFR BLD AUTO: 4.6 %
NEUTROPHILS # BLD AUTO: 5.2 X10(3)/MCL (ref 2.1–9.2)
NEUTROPHILS NFR BLD AUTO: 82 %
NITRITE UR QL STRIP.AUTO: NEGATIVE
NRBC BLD AUTO-RTO: 0 %
PH UR STRIP.AUTO: 6 [PH]
PLATELET # BLD AUTO: 161 X10(3)/MCL (ref 130–400)
PMV BLD AUTO: 9.8 FL (ref 9.4–12.4)
POTASSIUM SERPL-SCNC: 4.2 MMOL/L (ref 3.5–5.1)
PROT SERPL-MCNC: 6.9 GM/DL (ref 5.8–7.6)
PROT UR QL STRIP.AUTO: NEGATIVE MG/DL
RBC # BLD AUTO: 4.77 X10(6)/MCL (ref 4.2–5.4)
RBC #/AREA URNS AUTO: ABNORMAL /HPF
RBC UR QL AUTO: ABNORMAL UNIT/L
SARS-COV-2 RDRP RESP QL NAA+PROBE: NEGATIVE
SODIUM SERPL-SCNC: 140 MMOL/L (ref 136–145)
SP GR UR STRIP.AUTO: 1.02
SQUAMOUS #/AREA URNS AUTO: ABNORMAL /HPF
TROPONIN I SERPL-MCNC: 0.02 NG/ML (ref 0–0.04)
UROBILINOGEN UR STRIP-ACNC: 0.2 MG/DL
WBC # SPEC AUTO: 6.3 X10(3)/MCL (ref 4.5–11.5)
WBC #/AREA URNS AUTO: ABNORMAL /HPF

## 2022-06-19 PROCEDURE — 97162 PT EVAL MOD COMPLEX 30 MIN: CPT

## 2022-06-19 PROCEDURE — G0378 HOSPITAL OBSERVATION PER HR: HCPCS

## 2022-06-19 PROCEDURE — 85025 COMPLETE CBC W/AUTO DIFF WBC: CPT | Performed by: EMERGENCY MEDICINE

## 2022-06-19 PROCEDURE — 93005 ELECTROCARDIOGRAM TRACING: CPT

## 2022-06-19 PROCEDURE — 87635 SARS-COV-2 COVID-19 AMP PRB: CPT | Performed by: EMERGENCY MEDICINE

## 2022-06-19 PROCEDURE — 81001 URINALYSIS AUTO W/SCOPE: CPT | Performed by: INTERNAL MEDICINE

## 2022-06-19 PROCEDURE — 93010 EKG 12-LEAD: ICD-10-PCS | Mod: ,,, | Performed by: INTERNAL MEDICINE

## 2022-06-19 PROCEDURE — 83880 ASSAY OF NATRIURETIC PEPTIDE: CPT | Performed by: EMERGENCY MEDICINE

## 2022-06-19 PROCEDURE — 63600175 PHARM REV CODE 636 W HCPCS: Performed by: EMERGENCY MEDICINE

## 2022-06-19 PROCEDURE — 25000003 PHARM REV CODE 250: Performed by: EMERGENCY MEDICINE

## 2022-06-19 PROCEDURE — 96374 THER/PROPH/DIAG INJ IV PUSH: CPT

## 2022-06-19 PROCEDURE — 36415 COLL VENOUS BLD VENIPUNCTURE: CPT | Performed by: EMERGENCY MEDICINE

## 2022-06-19 PROCEDURE — 94761 N-INVAS EAR/PLS OXIMETRY MLT: CPT

## 2022-06-19 PROCEDURE — 80053 COMPREHEN METABOLIC PANEL: CPT | Performed by: EMERGENCY MEDICINE

## 2022-06-19 PROCEDURE — 93010 ELECTROCARDIOGRAM REPORT: CPT | Mod: ,,, | Performed by: INTERNAL MEDICINE

## 2022-06-19 PROCEDURE — 25000003 PHARM REV CODE 250: Performed by: INTERNAL MEDICINE

## 2022-06-19 PROCEDURE — 84484 ASSAY OF TROPONIN QUANT: CPT | Performed by: EMERGENCY MEDICINE

## 2022-06-19 RX ORDER — ACETAMINOPHEN 325 MG/1
650 TABLET ORAL EVERY 8 HOURS PRN
Status: DISCONTINUED | OUTPATIENT
Start: 2022-06-19 | End: 2022-06-20 | Stop reason: HOSPADM

## 2022-06-19 RX ORDER — ATORVASTATIN CALCIUM 10 MG/1
20 TABLET, FILM COATED ORAL DAILY
Status: DISCONTINUED | OUTPATIENT
Start: 2022-06-19 | End: 2022-06-20 | Stop reason: HOSPADM

## 2022-06-19 RX ORDER — TALC
6 POWDER (GRAM) TOPICAL NIGHTLY PRN
Status: DISCONTINUED | OUTPATIENT
Start: 2022-06-19 | End: 2022-06-20 | Stop reason: HOSPADM

## 2022-06-19 RX ORDER — SODIUM CHLORIDE 9 MG/ML
1000 INJECTION, SOLUTION INTRAVENOUS
Status: COMPLETED | OUTPATIENT
Start: 2022-06-19 | End: 2022-06-19

## 2022-06-19 RX ORDER — IBUPROFEN 400 MG/1
400 TABLET ORAL EVERY 6 HOURS PRN
Status: DISCONTINUED | OUTPATIENT
Start: 2022-06-19 | End: 2022-06-20 | Stop reason: HOSPADM

## 2022-06-19 RX ORDER — NITROFURANTOIN 25; 75 MG/1; MG/1
100 CAPSULE ORAL 2 TIMES DAILY
Status: DISCONTINUED | OUTPATIENT
Start: 2022-06-19 | End: 2022-06-20 | Stop reason: HOSPADM

## 2022-06-19 RX ORDER — NALOXONE HCL 0.4 MG/ML
0.02 VIAL (ML) INJECTION
Status: DISCONTINUED | OUTPATIENT
Start: 2022-06-19 | End: 2022-06-20 | Stop reason: HOSPADM

## 2022-06-19 RX ORDER — SODIUM CHLORIDE 0.9 % (FLUSH) 0.9 %
10 SYRINGE (ML) INJECTION
Status: DISCONTINUED | OUTPATIENT
Start: 2022-06-19 | End: 2022-06-20 | Stop reason: HOSPADM

## 2022-06-19 RX ORDER — SODIUM CHLORIDE 0.9 % (FLUSH) 0.9 %
10 SYRINGE (ML) INJECTION EVERY 12 HOURS PRN
Status: DISCONTINUED | OUTPATIENT
Start: 2022-06-19 | End: 2022-06-20 | Stop reason: HOSPADM

## 2022-06-19 RX ORDER — ONDANSETRON 2 MG/ML
4 INJECTION INTRAMUSCULAR; INTRAVENOUS
Status: COMPLETED | OUTPATIENT
Start: 2022-06-19 | End: 2022-06-19

## 2022-06-19 RX ORDER — ONDANSETRON 2 MG/ML
4 INJECTION INTRAMUSCULAR; INTRAVENOUS EVERY 8 HOURS PRN
Status: DISCONTINUED | OUTPATIENT
Start: 2022-06-19 | End: 2022-06-20 | Stop reason: HOSPADM

## 2022-06-19 RX ORDER — CITALOPRAM 20 MG/1
40 TABLET, FILM COATED ORAL DAILY
Status: DISCONTINUED | OUTPATIENT
Start: 2022-06-19 | End: 2022-06-20 | Stop reason: HOSPADM

## 2022-06-19 RX ADMIN — ONDANSETRON 4 MG: 2 INJECTION INTRAMUSCULAR; INTRAVENOUS at 02:06

## 2022-06-19 RX ADMIN — CITALOPRAM HYDROBROMIDE 40 MG: 20 TABLET ORAL at 02:06

## 2022-06-19 RX ADMIN — LINACLOTIDE 145 MCG: 145 CAPSULE, GELATIN COATED ORAL at 02:06

## 2022-06-19 RX ADMIN — NITROFURANTOIN 100 MG: 25; 75 CAPSULE ORAL at 02:06

## 2022-06-19 RX ADMIN — ATORVASTATIN CALCIUM 20 MG: 10 TABLET, FILM COATED ORAL at 02:06

## 2022-06-19 RX ADMIN — SODIUM CHLORIDE 1000 ML: 9 INJECTION, SOLUTION INTRAVENOUS at 05:06

## 2022-06-19 RX ADMIN — NITROFURANTOIN 100 MG: 25; 75 CAPSULE ORAL at 08:06

## 2022-06-19 NOTE — H&P
[facility]    [facility] Medicine History & Physical Examination       Patient Name: Elizabeth Navarrete  MRN: [id number]  Patient Class: OP- Observation   Admission Date: 6/18/2022   Admitting Physician: COLEEN Service   Length of Stay: 0  Attending Physician: Sammy Call MD  Primary Care Provider: Gerald Jean-Baptiste MD  Face-to-Face encounter date: 06/19/2022    Code Status: Full Code    Chief Complaint: Nausea and Abdominal Pain          HISTORY OF PRESENT ILLNESS:   Elizabeth Navarrete is a 82 y.o. female who  has a past medical history of Asthma, Depression, Hyperlipidemia, and IBS (irritable bowel syndrome).. The patient presented to [facility] on 6/18/2022 with a primary complaint of dizziness, nausea and abd pain. She had been evaluated in the ER 2 days ago after she became dizzy and fell at home. She was treated and released. However, here dizziness has persisted and it leads to nausea and abd pain. The symptoms started 2 days ago. The chart mentions a hx of vertigo but she says it has been some time since she had an episode. This am she is starting to feel better. The dizziness was triggered with movement and activity. So far today it hasn't occurred. She tolerated a liquid diet. She denies nausea and abd pain at this time. She has no other c/o.    PAST MEDICAL HISTORY:     Past Medical History:   Diagnosis Date    Asthma     Depression     Hyperlipidemia     IBS (irritable bowel syndrome)        PAST SURGICAL HISTORY:     Past Surgical History:   Procedure Laterality Date    BLADDER SUSPENSION      HYSTERECTOMY         ALLERGIES:   Patient has no known allergies.    FAMILY HISTORY:   family history includes Cancer in her father and son; No Known Problems in her mother.    SOCIAL HISTORY:     Social History     Tobacco Use    Smoking status: Never Smoker    Smokeless tobacco: Never Used   Substance Use Topics    Alcohol use: Not Currently        HOME MEDICATIONS:     Prior to  Admission medications    Medication Sig Start Date End Date Taking? Authorizing Provider   atorvastatin (LIPITOR) 20 MG tablet Take 20 mg by mouth once daily. 4/1/22   Historical Provider   citalopram (CELEXA) 40 MG tablet Take 40 mg by mouth once daily. 3/9/22   Historical Provider   LINZESS 145 mcg Cap capsule Take 145 mcg by mouth once daily. 3/22/22   Historical Provider   nitrofurantoin, macrocrystal-monohydrate, (MACROBID) 100 MG capsule Take 1 capsule (100 mg total) by mouth 2 (two) times daily. for 5 days 6/17/22 6/22/22  Ranjith Vora IV, MD   SYMBICORT 160-4.5 mcg/actuation HFAA Inhale 1 puff into the lungs 4 (four) times daily as needed. 5/2/22   Historical Provider       REVIEW OF SYSTEMS:   Except as documented, all other systems reviewed and negative   Review of Systems   Constitutional: Negative for fever.   Respiratory: Negative for shortness of breath.    Gastrointestinal: Negative for abdominal pain, diarrhea and vomiting.   Musculoskeletal: Positive for falls.   Neurological: Negative for headaches.         PHYSICAL EXAM:     VITAL SIGNS: 24 HRS MIN & MAX LAST   Temp  Min: 97.7 °F (36.5 °C)  Max: 98.8 °F (37.1 °C) 97.8 °F (36.6 °C)   BP  Min: 130/75  Max: 159/79 130/75   Pulse  Min: 74  Max: 82  82   Resp  Min: 18  Max: 20 20   SpO2  Min: 93 %  Max: 98 % (!) 93 %       General appearance: Frail appearing elderly female in no apparent distress.  HENT: Atraumatic head. Moist mucous membranes of oral cavity.  Eyes: Normal extraocular movements.   Neck: Supple. No LAD  Lungs: Clear to auscultation bilaterally. No wheezing present.   Heart: Regular rate and rhythm. S1 and S2 present with no murmurs/gallop/rub. No pedal edema. No JVD present.   Abdomen: Soft, non-distended, non-tender. No rebound tenderness/guarding. Bowel sounds are normal.   Extremities: No cyanosis, clubbing, or edema.  Skin: No Rash.   Neuro: Motor and sensory exams grossly intact. Good tone. Muscle strength 5/5 in all 4  extremities  Psych/mental status: Appropriate mood and affect. Responds appropriately to questions.     LABS AND IMAGING:     Recent Labs   Lab 06/17/22  1724 06/19/22  0010   WBC 14.2* 6.3   RBC 5.08 4.77   HGB 15.1 14.2   HCT 44.3 41.9   MCV 87.2 87.8   MCH 29.7 29.8   MCHC 34.1 33.9   RDW 12.8 12.6    161   MPV 10.4 9.8       Recent Labs   Lab 06/17/22  1724 06/19/22  0010    140   K 3.7 4.2   CO2 26 25   BUN 19.6 13.9   CREATININE 0.99 0.75   CALCIUM 9.9 9.3   ALBUMIN 4.0 3.7   ALKPHOS 84 73   ALT 11 10   AST 20 23   BILITOT 0.6 0.8       Microbiology Results (last 7 days)     ** No results found for the last 168 hours. **           X-Ray Chest 1 View  Narrative: EXAMINATION:  XR CHEST 1 VIEW    CLINICAL HISTORY:  Weakness    TECHNIQUE:  One view    COMPARISON:  November 29, 2018.    FINDINGS:  Cardiopericardial silhouette is within normal limits. Lungs are without dense focal or segmental consolidation, congestive process, pleural effusions or pneumothorax.  Impression: NO ACUTE CARDIOPULMONARY PROCESS IDENTIFIED.    Electronically signed by: Cam Romero  Date:    06/17/2022  Time:    19:45  CT Cervical Spine Without Contrast  Narrative: EXAMINATION:  CT CERVICAL SPINE WITHOUT CONTRAST    CLINICAL HISTORY:  Trauma.    TECHNIQUE:  Multidetector axial images were performed of the cervical spine without and.  Images were reconstructed.    Automated exposure control was utilized to minimize radiation dose.  DLP 1185.    COMPARISON:    None available.    FINDINGS:  There is grade 1 anterolisthesis of C4 on C5 which is degenerative is, cervical vertebrae stature alignment is preserved.  No acute fracture or malalignment identified.  There are degenerative changes which cause ventral impression upon the thecal sac and narrowings of the neural foramen.  There is no prevertebral soft tissue prominence.    This study does not exclude the possibility of intrathecal soft tissue, ligamentous or vascular  injury.  Impression: No acute fracture or malalignment identified.    Electronically signed by: Cam Romero  Date:    06/17/2022  Time:    19:12  CT Head Without Contrast  Narrative: EXAMINATION:  CT HEAD WITHOUT CONTRAST    CLINICAL HISTORY:  fall, dizziness, vomiting;    TECHNIQUE:  Sequential axial images were performed of the brain without contrast.    Dose product length of 1185 mGycm. Automated exposure control was utilized to minimize radiation dose.    COMPARISON:  None available.    FINDINGS:  There is no intracranial mass effect, midline shift, hydrocephalus or hemorrhage. There is no sulcal effacement or low attenuation changes to suggest recent large vessel territory infarction. Chronic appearing periventricular and subcortical white matter low attenuation changes are present and are consistent with chronic microangiopathic ischemia. The ventricular system and sulcal markings prominence is consistent with atrophy. There is no acute extra axial fluid collection.  There is no acute depressed calvarial fracture.    Bilateral with thickening of the maxillary and ethmoidal air cells.  Mastoid air cells aeration is optimal.  Impression: No acute intracranial traumatic findings identified.    Electronically signed by: Cam Romero  Date:    06/17/2022  Time:    19:08        __________________________________________________________________________  INPATIENT LIST OF MEDICATIONS     Scheduled Meds:   atorvastatin  20 mg Oral Daily    citalopram  40 mg Oral Daily    linaCLOtide  145 mcg Oral Daily    nitrofurantoin (macrocrystal-monohydrate)  100 mg Oral BID     Continuous Infusions:  PRN Meds:acetaminophen, ibuprofen, melatonin, naloxone, ondansetron, sodium chloride 0.9%, sodium chloride 0.9%          ASSESSMENT & PLAN:     Dizziness  Nausea  Weakness  Recent Fall  Hx of HLP  Hx IBS  UTI    Plan  Advance diet  Mobilize pt  Monitor overnight, if symptoms resolved dc home in am  Cont macrobid, pt started on  tx on prior ER visit      Sammy Call MD   06/19/2022

## 2022-06-19 NOTE — ED NOTES
Bed: 05  Expected date: 6/18/22  Expected time: 11:18 PM  Means of arrival:   Comments:  70 F UTI / Nausea / Re-route

## 2022-06-19 NOTE — PLAN OF CARE
Problem: Adult Inpatient Plan of Care  Goal: Plan of Care Review  Outcome: Ongoing, Progressing  Goal: Patient-Specific Goal (Individualized)  Outcome: Ongoing, Progressing  Goal: Absence of Hospital-Acquired Illness or Injury  Outcome: Ongoing, Progressing  Goal: Optimal Comfort and Wellbeing  Outcome: Ongoing, Progressing  Goal: Readiness for Transition of Care  Outcome: Ongoing, Progressing     Problem: Fall Injury Risk  Goal: Absence of Fall and Fall-Related Injury  Outcome: Ongoing, Progressing     Problem: COPD (Chronic Obstructive Pulmonary Disease) Comorbidity  Goal: Maintenance of COPD Symptom Control  Outcome: Ongoing, Progressing     Problem: Hypertension Comorbidity  Goal: Blood Pressure in Desired Range  Outcome: Ongoing, Progressing

## 2022-06-19 NOTE — PLAN OF CARE
Problem: Adult Inpatient Plan of Care  Goal: Plan of Care Review  Outcome: Ongoing, Not Progressing  Goal: Patient-Specific Goal (Individualized)  Outcome: Ongoing, Not Progressing  Goal: Absence of Hospital-Acquired Illness or Injury  Outcome: Ongoing, Not Progressing  Goal: Optimal Comfort and Wellbeing  Outcome: Ongoing, Not Progressing  Goal: Readiness for Transition of Care  Outcome: Ongoing, Not Progressing     Problem: Fall Injury Risk  Goal: Absence of Fall and Fall-Related Injury  Outcome: Ongoing, Not Progressing     Problem: Behavioral Health Comorbidity  Goal: Maintenance of Behavioral Health Symptom Control  Outcome: Ongoing, Not Progressing     Problem: COPD (Chronic Obstructive Pulmonary Disease) Comorbidity  Goal: Maintenance of COPD Symptom Control  Outcome: Ongoing, Not Progressing     Problem: Hypertension Comorbidity  Goal: Blood Pressure in Desired Range  Outcome: Ongoing, Not Progressing

## 2022-06-19 NOTE — HPI
Elizabeth Navarrete is a 82 y.o. female who  has a past medical history of Asthma, Depression, Hyperlipidemia, and IBS (irritable bowel syndrome).. The patient presented to [facility] on 6/18/2022 with a primary complaint of dizziness, nausea and abd pain. She had been evaluated in the ER 2 days ago after she became dizzy and fell at home. She was treated and released. However, here dizziness has persisted and it leads to nausea and abd pain. The symptoms started 2 days ago. The chart mentions a hx of vertigo but she says it has been some time since she had an episode. This am she is starting to feel better. The dizziness was triggered with movement and activity. So far today it hasn't occurred. She tolerated a liquid diet. She denies nausea and abd pain at this time. She has no other c/o.

## 2022-06-19 NOTE — PT/OT/SLP EVAL
"Physical Therapy Evaluation    Patient Name:  Elizabeth Navarrete   MRN:  [id number]    Recommendations:     Discharge Recommendations:      Discharge Equipment Recommendations: other (see comments) (pt refused RW and straight cane, but would benefit from use of an AD)   Barriers to discharge: dizziness, impaired safety with functional mobility, decreased family/caregiver support    Assessment:     Elizabeth Navarrete is a 82 y.o. female admitted with a medical diagnosis of Dizziness.  She presents with the following impairments/functional limitations:  impaired functional mobilty, decreased safety awareness, gait instability, impaired balance impaired balance, dizziness.    Rehab Prognosis: Good; patient would benefit from acute skilled PT services to address these deficits and reach maximum level of function.    Recent Surgery: * No surgery found *      Plan:     During this hospitalization, patient to be seen daily to address the identified rehab impairments via gait training, therapeutic activities, therapeutic exercises and progress toward the following goals:    · Plan of Care Expires:  06/24/22    Subjective     Chief Complaint: dizziness  Patient/Family Comments/goals: "take care of myself"  Pain/Comfort:  Pain Rating 1: 0/10    Patients cultural, spiritual, Denominational conflicts given the current situation:      Living Environment:  Home with ; pts  is unable to physically assist much.    3 steps to enter home without railings.    Prior to admission, patients level of function was independent per pt report; however, pt reports that she feels like she is at her baseline functional status.  Equipment used at home: none.     Objective:     Communicated with NSG prior to session.  Patient found supine with    upon PT entry to room.    General Precautions: Standard,     Orthopedic Precautions:    Braces:    Respiratory Status: Room air    Exams:  · RLE Strength: WFL  · LLE Strength: WFL    Functional " Mobility:  · Bed Mobility:     · Supine to Sit: independence  · Sit to Supine: independence  · Transfers:     · Sit to Stand:  supervision with no AD  · Bed to Chair: supervision with  no AD  using  Step Transfer  · Gait: 400' SBA/CGA without use of an AD. Occasional unsteadiness noted with scissoring gait at times and pt having to take an extra step to the side to correct LOBs.  · Stairs:  Pt ascended/descended 3 stair(s) x2 completions with SBA; encouraged to not use handrails but pt had to catch herself on R and L rails multiple times while performing stairs d/t LOBs.       AM-PAC 6 CLICK MOBILITY  Total Score:      Patient left supine with call button in reach.    GOALS:   Multidisciplinary Problems     Physical Therapy Goals        Problem: Physical Therapy    Goal Priority Disciplines Outcome Goal Variances Interventions   Physical Therapy Goal     PT, PT/OT      Description: Sit<>stand: Independent  Bed<>chair: Independent  Gait: 200' Independent  Stairs: 3 steps without railings Independent                           History:     Past Medical History:   Diagnosis Date    Asthma     Depression     Hyperlipidemia     IBS (irritable bowel syndrome)        Past Surgical History:   Procedure Laterality Date    BLADDER SUSPENSION      HYSTERECTOMY         Time Tracking:     PT Received On:    PT Start Time: 1300     PT Stop Time: 1320  PT Total Time (min): 20 min     Billable Minutes: Evaluation 20 min      06/19/2022

## 2022-06-19 NOTE — ED PROVIDER NOTES
Encounter Date: 6/18/2022       History     Chief Complaint   Patient presents with    Nausea    Abdominal Pain     Ms. Navarrete is an 82-year-old female with a history of vertigo, HLD, GERD, and asthma presents by way of ambulance [facility] complaining of a 2 day history of generalized weakness and severe nausea.  She was seen in the emergency room yesterday at [facility] and had an evaluation due to weakness and fall which included a CT head which was negative, CT C-spine which was negative, lab work which was benign other than UTI.  She was given nitrofurantoin and discharged home but she states she has been too weak and nauseated to eat or drink anything.      Nausea  This is a new problem. The current episode started 2 days ago. The problem occurs constantly. The problem has not changed since onset.Associated symptoms include abdominal pain. Nothing aggravates the symptoms. Nothing relieves the symptoms.   Abdominal Pain  The other symptoms of the illness include nausea.     Review of patient's allergies indicates:  No Known Allergies  Past Medical History:   Diagnosis Date    Asthma     Depression     Hyperlipidemia     IBS (irritable bowel syndrome)      Past Surgical History:   Procedure Laterality Date    BLADDER SUSPENSION      HYSTERECTOMY       Family History   Problem Relation Age of Onset    No Known Problems Mother     Cancer Father      Social History     Tobacco Use    Smoking status: Never Smoker    Smokeless tobacco: Never Used   Substance Use Topics    Alcohol use: Not Currently    Drug use: Never     Review of Systems   Gastrointestinal: Positive for abdominal pain and nausea.   Neurological: Positive for weakness.   All other systems reviewed and are negative.      Physical Exam     Initial Vitals [06/18/22 2339]   BP Pulse Resp Temp SpO2   (!) 159/93 74 18 97.7 °F (36.5 °C) 98 %      MAP       --         Physical Exam    Nursing  note and vitals reviewed.  Constitutional: She appears well-developed and well-nourished. She is not diaphoretic. No distress.   Frail appearing   HENT:   Head: Normocephalic and atraumatic.   Mouth/Throat: Oropharynx is clear and moist.   Eyes: Conjunctivae are normal. Pupils are equal, round, and reactive to light.   Neck: Neck supple.   Cardiovascular: Normal rate, regular rhythm, normal heart sounds and intact distal pulses.   Pulmonary/Chest: Breath sounds normal. No respiratory distress. She has no wheezes. She has no rhonchi. She has no rales.   Abdominal: Abdomen is soft. Bowel sounds are normal. She exhibits no distension. There is no abdominal tenderness. There is no guarding.   Musculoskeletal:         General: No tenderness or edema. Normal range of motion.      Cervical back: Neck supple.     Neurological: She is alert and oriented to person, place, and time.   Skin: Skin is warm and dry. Capillary refill takes less than 2 seconds. No rash noted.   Psychiatric: She has a normal mood and affect. Thought content normal.         ED Course   Procedures  Labs Reviewed   B-TYPE NATRIURETIC PEPTIDE - Abnormal; Notable for the following components:       Result Value    Natriuretic Peptide 128.1 (*)     All other components within normal limits   TROPONIN I - Normal   SARS-COV-2 RNA AMPLIFICATION, QUAL - Normal   CBC WITH DIFFERENTIAL   COMPREHENSIVE METABOLIC PANEL     EKG Readings: (Independently Interpreted)   Initial Reading: No STEMI. Rhythm: Normal Sinus Rhythm. Heart Rate: 77. Ectopy: No Ectopy. ST Segment Depression: V5. T Waves Flipped: III, AVF, V5 and V6. Clinical Impression: Normal Sinus Rhythm Other Impression: Nonspecific ST abnormality and inverted T-waves, EKG performed yesterday has a similar description but I am unable to view the images on epic       Imaging Results    None          Medications   sodium chloride 0.9% flush 10 mL (has no administration in time range)   melatonin tablet 6 mg  (has no administration in time range)   ondansetron injection 4 mg (has no administration in time range)   0.9%  NaCl infusion (has no administration in time range)   ondansetron injection 4 mg (4 mg Intravenous Given 6/19/22 0207)     Medical Decision Making:   Initial Assessment:   82-year-old female presents to the emergency room complaining of generalized weakness and nausea and has not been able to eat or drink anything since yesterday.  She was seen at [facility] yesterday due to weakness and fall and had a negative workup other than UTI.  Differential Diagnosis:   Deconditioning, UTI, electrolyte abnormality, dehydration, sepsis, failure to thrive  Clinical Tests:   Lab Tests: Reviewed  Radiological Study: Reviewed  ED Management:  Patient was given IV Zofran 4 mg x 2 and IV fluids.  She complains of continued nausea and is unable to stand due to generalized weakness.  She has no focal deficits or indication for repeat head CT.  I have discussed her care with her son who is present and he states that she cannot go home she can not walk as she needs to be able to walk at home with her walker.  Other:   I have discussed this case with another health care provider.       <> Summary of the Discussion: Case discussed with the physician on-call for the hospitalist service, Dr. Abdirashid Velázquez. Patient will be admitted to [facility] for observation, and further evaluation of her generalized weakness and nausea.             ED Course as of 06/19/22 0408   Sun Jun 19, 2022   0218 Patient continues to complain of severe nausea despite 2 doses of the Zofran.  We did attempt to stand her but she was unable to stand saying she was too weak.  She has no focal deficits her evaluation is relatively benign.  I will contact the hospitalist for observation. [SH]      ED Course User Index  [SH] Violet Uribe MD             Clinical Impression:   Final diagnoses:  [R53.1]  Weakness  [R11.2] Intractable nausea and vomiting (Primary)  [R53.1] General weakness          ED Disposition Condition    Observation               Violet Uribe MD  06/19/22 3421

## 2022-06-20 VITALS
BODY MASS INDEX: 23.53 KG/M2 | OXYGEN SATURATION: 95 % | DIASTOLIC BLOOD PRESSURE: 74 MMHG | HEIGHT: 62 IN | WEIGHT: 127.88 LBS | RESPIRATION RATE: 18 BRPM | HEART RATE: 66 BPM | SYSTOLIC BLOOD PRESSURE: 122 MMHG | TEMPERATURE: 99 F

## 2022-06-20 PROCEDURE — 25000003 PHARM REV CODE 250: Performed by: INTERNAL MEDICINE

## 2022-06-20 PROCEDURE — G0378 HOSPITAL OBSERVATION PER HR: HCPCS

## 2022-06-20 RX ORDER — MECLIZINE HYDROCHLORIDE 25 MG/1
25 TABLET ORAL 3 TIMES DAILY PRN
Qty: 30 TABLET | Refills: 0 | Status: SHIPPED | OUTPATIENT
Start: 2022-06-20 | End: 2023-06-22

## 2022-06-20 RX ADMIN — ATORVASTATIN CALCIUM 20 MG: 10 TABLET, FILM COATED ORAL at 08:06

## 2022-06-20 RX ADMIN — LINACLOTIDE 145 MCG: 145 CAPSULE, GELATIN COATED ORAL at 08:06

## 2022-06-20 RX ADMIN — CITALOPRAM HYDROBROMIDE 40 MG: 20 TABLET ORAL at 08:06

## 2022-06-20 RX ADMIN — NITROFURANTOIN 100 MG: 25; 75 CAPSULE ORAL at 08:06

## 2022-06-20 NOTE — DISCHARGE SUMMARY
"[facility]   [facility] Medicine Discharge Summary    Admit Date: 6/18/2022  Discharge Date and Time: 6/20/20223:27 PM  Admitting Physician: [unfilled]  Discharging Physician: Sammy Merritt MD.  Primary Care Physician: Gerald Jean-Baptiste MD  Consults (From admission, onward)        Status Ordering Provider     Inpatient consult to Social Work/Case Management  Once        Provider:  (Not yet assigned)    Ordered SAMMY MERRITT               Discharge Diagnoses:  Dizziness -resolved  Nausea -resolved  Weakness -improved  Recent Fall  Hx of HLP  Hx IBS  UTI     Plan  DC home with home health  Complete her couse of abx for UTI  Resume home meds.  RX for prn Antivert written  F/u with pcp in 2 weeks for checkup        HPI  Elizabeth Navarrete is a 82 y.o. female who  has a past medical history of Asthma, Depression, Hyperlipidemia, and IBS (irritable bowel syndrome).. The patient presented to [facility] on 6/18/2022 with a primary complaint of dizziness, nausea and abd pain. She had been evaluated in the ER 2 days ago after she became dizzy and fell at home. She was treated and released. However, here dizziness has persisted and it leads to nausea and abd pain. The symptoms started 2 days ago. The chart mentions a hx of vertigo but she says it has been some time since she had an episode. This am she is starting to feel better. The dizziness was triggered with movement and activity. So far today it hasn't occurred. She tolerated a liquid diet. She denies nausea and abd pain at this time. She has no other c/o.        Hospital Course:   The patient was monitored overnight. Her dizziness hasn't returned. She was evaluated by physical therapist yesterday. She declined therapy today. She said she was feeling "lazy". She walk to the bathroom with the CNA earlier. I personal had her get up and walk in the room with me. She didn't need any assistance and it didn't trigger a dizzy spell. She is " stable for dc home. She is in agreement. We will setup home deonte to for therapy to work with her at home.      Pt was seen and examined on the day of discharge  Vitals:  VITAL SIGNS: 24 HRS MIN & MAX LAST   Temp  Min: 97.9 °F (36.6 °C)  Max: 99.3 °F (37.4 °C) 97.9 °F (36.6 °C)   BP  Min: 99/63  Max: 157/90 (!) 157/90   Pulse  Min: 63  Max: 78  63   Resp  Min: 18  Max: 18 18   SpO2  Min: 92 %  Max: 97 % 97 %       Physical Exam:  General appearance: Elderly female in no apparent distress.  HENT: Atraumatic head.  Neck: Supple.   Lungs: Clear to auscultation bilaterally. No wheezing present.   Heart: Regular rate and rhythm. S1 and S2 present with no murmurs/gallop/rub. No pedal edema. No JVD present.   Abdomen: Soft, non-distended, non-tender. No rebound tenderness/guarding. Bowel sounds are normal.   Extremities: No cyanosis, clubbing, or edema.  Skin: No Rash.   Neuro: Motor and sensory exams grossly intact. Good tone. Muscle strength 5/5 in all 4 extremities. Normal gait.  Psych/mental status: Appropriate mood and affect. Responds appropriately to questions.       Procedures Performed: No admission procedures for hospital encounter.     Significant Diagnostic Studies: See Full reports for all details    Recent Labs   Lab 06/17/22  1724 06/19/22  0010   WBC 14.2* 6.3   RBC 5.08 4.77   HGB 15.1 14.2   HCT 44.3 41.9   MCV 87.2 87.8   MCH 29.7 29.8   MCHC 34.1 33.9   RDW 12.8 12.6    161   MPV 10.4 9.8       Recent Labs   Lab 06/17/22  1724 06/19/22  0010    140   K 3.7 4.2   CO2 26 25   BUN 19.6 13.9   CREATININE 0.99 0.75   CALCIUM 9.9 9.3   ALBUMIN 4.0 3.7   ALKPHOS 84 73   ALT 11 10   AST 20 23   BILITOT 0.6 0.8        Microbiology Results (last 7 days)     ** No results found for the last 168 hours. **           X-Ray Chest 1 View  Narrative: EXAMINATION:  XR CHEST 1 VIEW    CLINICAL HISTORY:  Weakness    TECHNIQUE:  One view    COMPARISON:  November 29, 2018.    FINDINGS:  Cardiopericardial  silhouette is within normal limits. Lungs are without dense focal or segmental consolidation, congestive process, pleural effusions or pneumothorax.  Impression: NO ACUTE CARDIOPULMONARY PROCESS IDENTIFIED.    Electronically signed by: Cam Romero  Date:    06/17/2022  Time:    19:45  CT Cervical Spine Without Contrast  Narrative: EXAMINATION:  CT CERVICAL SPINE WITHOUT CONTRAST    CLINICAL HISTORY:  Trauma.    TECHNIQUE:  Multidetector axial images were performed of the cervical spine without and.  Images were reconstructed.    Automated exposure control was utilized to minimize radiation dose.  DLP 1185.    COMPARISON:    None available.    FINDINGS:  There is grade 1 anterolisthesis of C4 on C5 which is degenerative is, cervical vertebrae stature alignment is preserved.  No acute fracture or malalignment identified.  There are degenerative changes which cause ventral impression upon the thecal sac and narrowings of the neural foramen.  There is no prevertebral soft tissue prominence.    This study does not exclude the possibility of intrathecal soft tissue, ligamentous or vascular injury.  Impression: No acute fracture or malalignment identified.    Electronically signed by: Cam Romero  Date:    06/17/2022  Time:    19:12  CT Head Without Contrast  Narrative: EXAMINATION:  CT HEAD WITHOUT CONTRAST    CLINICAL HISTORY:  fall, dizziness, vomiting;    TECHNIQUE:  Sequential axial images were performed of the brain without contrast.    Dose product length of 1185 mGycm. Automated exposure control was utilized to minimize radiation dose.    COMPARISON:  None available.    FINDINGS:  There is no intracranial mass effect, midline shift, hydrocephalus or hemorrhage. There is no sulcal effacement or low attenuation changes to suggest recent large vessel territory infarction. Chronic appearing periventricular and subcortical white matter low attenuation changes are present and are consistent with chronic  microangiopathic ischemia. The ventricular system and sulcal markings prominence is consistent with atrophy. There is no acute extra axial fluid collection.  There is no acute depressed calvarial fracture.    Bilateral with thickening of the maxillary and ethmoidal air cells.  Mastoid air cells aeration is optimal.  Impression: No acute intracranial traumatic findings identified.    Electronically signed by: Cam Romero  Date:    06/17/2022  Time:    19:08         Medication List      START taking these medications    meclizine 25 mg tablet  Commonly known as: ANTIVERT  Take 1 tablet (25 mg total) by mouth 3 (three) times daily as needed for Nausea or Dizziness.        CONTINUE taking these medications    atorvastatin 20 MG tablet  Commonly known as: LIPITOR     citalopram 40 MG tablet  Commonly known as: CeleXA     LINZESS 145 mcg Cap capsule  Generic drug: linaCLOtide     nitrofurantoin (macrocrystal-monohydrate) 100 MG capsule  Commonly known as: MACROBID  Take 1 capsule (100 mg total) by mouth 2 (two) times daily. for 5 days     SYMBICORT 160-4.5 mcg/actuation Hfaa  Generic drug: budesonide-formoterol 160-4.5 mcg           Where to Get Your Medications      These medications were sent to [facility]/pharmacy #[id number] [address] AT [address] [address]    Phone: [phone]   · meclizine 25 mg tablet          Explained in detail to the patient the discharge plan, medications, and follow-up visits. Pt understands and agrees with the treatment plan.  Discharge Disposition: Home or Self Care with home health  Discharged Condition: stable  Diet-   Dietary Orders (From admission, onward)     Start     Ordered    06/19/22 1043  Diet Adult Regular  Diet effective now         06/19/22 1042               Medications Per DC med rec  Activities as tolerated   Follow-up Information     Gerald Jean-Baptiste MD. Schedule an appointment as soon as possible for a visit in 2 week(s).     Specialty: Family Medicine  Why: For checkup  Contact information:  [address] [address]  [phone]                       For further questions contact hospitalist office.    Discharge time >30 minutes    For worsening symptoms, chest pain, shortness of breath, increased abdominal pain, high grade fever, stroke or stroke like symptoms, immediately go to the nearest Emergency Room or call 911 as soon as possible.      Sammy Stewart M.D, on 6/20/2022. at 3:27 PM.

## 2022-06-20 NOTE — PLAN OF CARE
Pt asked me to contact her dil Jeanne to choose HH. Listed HH providers in network with [facility] asked for referral to [facility]. Referral sent via [facility]. Plan dc home today per Dr Call.

## 2022-06-20 NOTE — PLAN OF CARE
06/20/22 0834   Medicare Message   Important Message from Medicare regarding Discharge Appeal Rights Given to patient/caregiver;Explained to patient/caregiver   IMM given and discussed with pt and Jeanne her dgt in law. Both voiced understanding of IMM.

## 2022-06-20 NOTE — PLAN OF CARE
Pt lives with her  Edwin who is able to help as needed in home. Pt's dgt in law,Jeanne Bello, helps as needed with transportation and any other needs as needed. Pt has 3 living children.  One son lives 3 houses from pt, he works during the day but available on weekends to help as needed.  Pt has a RW and straight cane at home available that she has not been using.  [facility].  We discussed dc plan..ie Home Health. Pt states she is not comfortable with strangers in her home. Jeanne offered to be there when HH visits are scheduled. Pt said she will think on HH and let me know.

## 2022-06-20 NOTE — PT/OT/SLP PROGRESS
Physical Therapy      Patient Name:  Elizabeth Navarrete   MRN:  [id number]    Patient not seen today secondary to Patient unwilling to participate. Notified case management and RN and informed them of pts current functional status per evaluation yesterday. Will follow-up as scheduling allows.

## 2022-06-20 NOTE — PLAN OF CARE
Problem: Fall Injury Risk  Goal: Absence of Fall and Fall-Related Injury  Outcome: Ongoing, Progressing  Intervention: Promote Injury-Free Environment  Flowsheets (Taken 6/20/2022 6225)  Safety Promotion/Fall Prevention:   assistive device/personal item within reach   bed alarm set

## 2022-06-21 PROCEDURE — G0180 MD CERTIFICATION HHA PATIENT: HCPCS | Mod: ,,, | Performed by: FAMILY MEDICINE

## 2022-06-21 PROCEDURE — G0180 PR HOME HEALTH MD CERTIFICATION: ICD-10-PCS | Mod: ,,, | Performed by: FAMILY MEDICINE

## 2022-06-22 ENCOUNTER — TELEPHONE (OUTPATIENT)
Dept: FAMILY MEDICINE | Facility: CLINIC | Age: 82
End: 2022-06-22
Payer: MEDICARE

## 2022-06-22 NOTE — TELEPHONE ENCOUNTER
RICKY. I spoke to Mrs[facility] at [facility] and she asked if Dr. Jean-Baptiste would be ok with signing home health orders (referral made by [facility] hospitalist). I advised her patient would need to be seen first (hast appointment on 6.29.22) and they he should be able to sign the orders. She will call next week to get a copy of office visit with Dr. Jean-Baptiste. Concepcion aware of appointment information. Bebe

## 2022-06-22 NOTE — TELEPHONE ENCOUNTER
----- Message from Sylvia Mclain sent at 6/22/2022 12:01 PM CDT -----  Regarding: Home health orders  .Type:  Needs Medical Advice    Who Called: [facility]  Symptoms (please be specific): NA  How long has patient had these symptoms:  NA  Pharmacy name and phone #:  NA  Would the patient rather a call back or a response via [facility]? Call back  Best Call Back Number: [phone]  Additional Information: Needs doc to sign Home Health orders

## 2022-06-23 LAB — POCT GLUCOSE: 120 MG/DL (ref 70–110)

## 2022-07-27 ENCOUNTER — EXTERNAL HOME HEALTH (OUTPATIENT)
Dept: HOME HEALTH SERVICES | Facility: HOSPITAL | Age: 82
End: 2022-07-27
Payer: MEDICARE

## 2022-08-03 ENCOUNTER — TELEPHONE (OUTPATIENT)
Dept: FAMILY MEDICINE | Facility: CLINIC | Age: 82
End: 2022-08-03
Payer: MEDICARE

## 2022-08-03 NOTE — TELEPHONE ENCOUNTER
Called pt to see what she needed due to unable to locate message at this time. No answer. VM did not .

## 2022-08-14 ENCOUNTER — DOCUMENT SCAN (OUTPATIENT)
Dept: HOME HEALTH SERVICES | Facility: HOSPITAL | Age: 82
End: 2022-08-14
Payer: MEDICARE

## 2022-08-15 ENCOUNTER — DOCUMENT SCAN (OUTPATIENT)
Dept: HOME HEALTH SERVICES | Facility: HOSPITAL | Age: 82
End: 2022-08-15
Payer: MEDICARE

## 2022-08-29 PROBLEM — Z00.00 MEDICARE ANNUAL WELLNESS VISIT, SUBSEQUENT: Status: RESOLVED | Noted: 2022-05-25 | Resolved: 2022-08-29

## 2022-09-01 ENCOUNTER — DOCUMENT SCAN (OUTPATIENT)
Dept: HOME HEALTH SERVICES | Facility: HOSPITAL | Age: 82
End: 2022-09-01
Payer: MEDICARE

## 2022-11-29 ENCOUNTER — OFFICE VISIT (OUTPATIENT)
Dept: FAMILY MEDICINE | Facility: CLINIC | Age: 82
End: 2022-11-29
Payer: MEDICARE

## 2022-11-29 VITALS
SYSTOLIC BLOOD PRESSURE: 114 MMHG | HEIGHT: 62 IN | HEART RATE: 87 BPM | WEIGHT: 111 LBS | RESPIRATION RATE: 20 BRPM | TEMPERATURE: 98 F | BODY MASS INDEX: 20.43 KG/M2 | OXYGEN SATURATION: 97 % | DIASTOLIC BLOOD PRESSURE: 83 MMHG

## 2022-11-29 DIAGNOSIS — I10 PRIMARY HYPERTENSION: Primary | Chronic | ICD-10-CM

## 2022-11-29 DIAGNOSIS — J41.0 SIMPLE CHRONIC BRONCHITIS: Chronic | ICD-10-CM

## 2022-11-29 DIAGNOSIS — F33.41 RECURRENT MAJOR DEPRESSIVE DISORDER, IN PARTIAL REMISSION: Chronic | ICD-10-CM

## 2022-11-29 DIAGNOSIS — J45.40 MODERATE PERSISTENT ASTHMA WITHOUT COMPLICATION: Chronic | ICD-10-CM

## 2022-11-29 DIAGNOSIS — E78.2 MIXED HYPERLIPIDEMIA: Chronic | ICD-10-CM

## 2022-11-29 DIAGNOSIS — Z23 NEED FOR VACCINATION: ICD-10-CM

## 2022-11-29 DIAGNOSIS — K59.09 CHRONIC CONSTIPATION: Chronic | ICD-10-CM

## 2022-11-29 PROBLEM — Z91.81 HISTORY OF FALLING: Status: ACTIVE | Noted: 2022-06-30

## 2022-11-29 PROBLEM — Z90.710 ACQUIRED ABSENCE OF BOTH CERVIX AND UTERUS: Status: ACTIVE | Noted: 2022-06-30

## 2022-11-29 PROBLEM — J45.909 UNSPECIFIED ASTHMA, UNCOMPLICATED: Status: ACTIVE | Noted: 2022-06-30

## 2022-11-29 PROBLEM — F32.A DEPRESSION, UNSPECIFIED: Status: ACTIVE | Noted: 2022-06-30

## 2022-11-29 PROBLEM — M43.12 SPONDYLOLISTHESIS, CERVICAL REGION: Status: ACTIVE | Noted: 2022-06-30

## 2022-11-29 PROBLEM — Z87.448 PERSONAL HISTORY OF OTHER DISEASES OF URINARY SYSTEM: Status: ACTIVE | Noted: 2022-06-30

## 2022-11-29 PROBLEM — J44.9 CHRONIC OBSTRUCTIVE PULMONARY DISEASE: Chronic | Status: ACTIVE | Noted: 2022-05-25

## 2022-11-29 PROBLEM — J45.909 UNSPECIFIED ASTHMA, UNCOMPLICATED: Chronic | Status: ACTIVE | Noted: 2022-06-30

## 2022-11-29 PROCEDURE — 1100F PR PT FALLS ASSESS DOC 2+ FALLS/FALL W/INJURY/YR: ICD-10-PCS | Mod: CPTII,,, | Performed by: FAMILY MEDICINE

## 2022-11-29 PROCEDURE — G0008 ADMIN INFLUENZA VIRUS VAC: HCPCS | Mod: ,,, | Performed by: FAMILY MEDICINE

## 2022-11-29 PROCEDURE — 90694 FLU VACCINE - QUADRIVALENT - ADJUVANTED: ICD-10-PCS | Mod: ,,, | Performed by: FAMILY MEDICINE

## 2022-11-29 PROCEDURE — 3288F FALL RISK ASSESSMENT DOCD: CPT | Mod: CPTII,,, | Performed by: FAMILY MEDICINE

## 2022-11-29 PROCEDURE — G0009 PNEUMOCOCCAL CONJUGATE VACCINE 20-VALENT: ICD-10-PCS | Mod: ,,, | Performed by: FAMILY MEDICINE

## 2022-11-29 PROCEDURE — 1126F PR PAIN SEVERITY QUANTIFIED, NO PAIN PRESENT: ICD-10-PCS | Mod: CPTII,,, | Performed by: FAMILY MEDICINE

## 2022-11-29 PROCEDURE — G0008 FLU VACCINE - QUADRIVALENT - ADJUVANTED: ICD-10-PCS | Mod: ,,, | Performed by: FAMILY MEDICINE

## 2022-11-29 PROCEDURE — G0009 ADMIN PNEUMOCOCCAL VACCINE: HCPCS | Mod: ,,, | Performed by: FAMILY MEDICINE

## 2022-11-29 PROCEDURE — 1159F MED LIST DOCD IN RCRD: CPT | Mod: CPTII,,, | Performed by: FAMILY MEDICINE

## 2022-11-29 PROCEDURE — 3079F PR MOST RECENT DIASTOLIC BLOOD PRESSURE 80-89 MM HG: ICD-10-PCS | Mod: CPTII,,, | Performed by: FAMILY MEDICINE

## 2022-11-29 PROCEDURE — 90677 PCV20 VACCINE IM: CPT | Mod: ,,, | Performed by: FAMILY MEDICINE

## 2022-11-29 PROCEDURE — 1160F RVW MEDS BY RX/DR IN RCRD: CPT | Mod: CPTII,,, | Performed by: FAMILY MEDICINE

## 2022-11-29 PROCEDURE — 99214 OFFICE O/P EST MOD 30 MIN: CPT | Mod: ,,, | Performed by: FAMILY MEDICINE

## 2022-11-29 PROCEDURE — 1126F AMNT PAIN NOTED NONE PRSNT: CPT | Mod: CPTII,,, | Performed by: FAMILY MEDICINE

## 2022-11-29 PROCEDURE — 90677 PNEUMOCOCCAL CONJUGATE VACCINE 20-VALENT: ICD-10-PCS | Mod: ,,, | Performed by: FAMILY MEDICINE

## 2022-11-29 PROCEDURE — 3079F DIAST BP 80-89 MM HG: CPT | Mod: CPTII,,, | Performed by: FAMILY MEDICINE

## 2022-11-29 PROCEDURE — 1100F PTFALLS ASSESS-DOCD GE2>/YR: CPT | Mod: CPTII,,, | Performed by: FAMILY MEDICINE

## 2022-11-29 PROCEDURE — 3074F PR MOST RECENT SYSTOLIC BLOOD PRESSURE < 130 MM HG: ICD-10-PCS | Mod: CPTII,,, | Performed by: FAMILY MEDICINE

## 2022-11-29 PROCEDURE — 90694 VACC AIIV4 NO PRSRV 0.5ML IM: CPT | Mod: ,,, | Performed by: FAMILY MEDICINE

## 2022-11-29 PROCEDURE — 99214 PR OFFICE/OUTPT VISIT, EST, LEVL IV, 30-39 MIN: ICD-10-PCS | Mod: ,,, | Performed by: FAMILY MEDICINE

## 2022-11-29 PROCEDURE — 1159F PR MEDICATION LIST DOCUMENTED IN MEDICAL RECORD: ICD-10-PCS | Mod: CPTII,,, | Performed by: FAMILY MEDICINE

## 2022-11-29 PROCEDURE — 3074F SYST BP LT 130 MM HG: CPT | Mod: CPTII,,, | Performed by: FAMILY MEDICINE

## 2022-11-29 PROCEDURE — 1160F PR REVIEW ALL MEDS BY PRESCRIBER/CLIN PHARMACIST DOCUMENTED: ICD-10-PCS | Mod: CPTII,,, | Performed by: FAMILY MEDICINE

## 2022-11-29 PROCEDURE — 3288F PR FALLS RISK ASSESSMENT DOCUMENTED: ICD-10-PCS | Mod: CPTII,,, | Performed by: FAMILY MEDICINE

## 2022-11-29 RX ORDER — BUDESONIDE AND FORMOTEROL FUMARATE DIHYDRATE 160; 4.5 UG/1; UG/1
1 AEROSOL RESPIRATORY (INHALATION) EVERY 12 HOURS
Qty: 10.2 G | Refills: 5 | Status: SHIPPED | OUTPATIENT
Start: 2022-11-29 | End: 2023-05-28

## 2022-11-29 RX ORDER — ATORVASTATIN CALCIUM 20 MG/1
20 TABLET, FILM COATED ORAL DAILY
Qty: 90 TABLET | Refills: 1 | Status: SHIPPED | OUTPATIENT
Start: 2022-11-29 | End: 2023-05-28

## 2022-11-29 RX ORDER — LINACLOTIDE 145 UG/1
145 CAPSULE, GELATIN COATED ORAL DAILY
Qty: 90 CAPSULE | Refills: 1 | Status: SHIPPED | OUTPATIENT
Start: 2022-11-29 | End: 2023-05-28

## 2022-11-29 RX ORDER — CITALOPRAM 40 MG/1
40 TABLET, FILM COATED ORAL DAILY
Qty: 90 TABLET | Refills: 1 | Status: SHIPPED | OUTPATIENT
Start: 2022-11-29 | End: 2023-05-28

## 2022-11-29 NOTE — PROGRESS NOTES
Subjective:      Patient ID: Elizabeth Navarrete is a 82 y.o. female.    Chief Complaint: Follow-up and Flu Vaccine      6 month surveillance visit and need for vaccines    Denies acute complaints    Needs refills of medications.  Problem List Items Addressed This Visit       Chronic constipation (Chronic)    Relevant Medications    LINZESS 145 mcg Cap capsule    Chronic obstructive pulmonary disease (Chronic)    Relevant Medications    SYMBICORT 160-4.5 mcg/actuation HFAA    Hypertension - Primary (Chronic)    Mixed hyperlipidemia (Chronic)    Relevant Medications    atorvastatin (LIPITOR) 20 MG tablet    Recurrent major depressive disorder, in partial remission (Chronic)    Relevant Medications    citalopram (CELEXA) 40 MG tablet    Unspecified asthma, uncomplicated (Chronic)    Relevant Medications    SYMBICORT 160-4.5 mcg/actuation HFAA     Other Visit Diagnoses       Need for vaccination        Relevant Orders    (In Office Administered) Pneumococcal Conjugate Vaccine (20 Valent) (IM)    Influenza (FLUAD) - Quadrivalent (Adjuvanted) *Preferred* (65+) (PF)            The patient's Health Maintenance was reviewed and the following appears to be due:   Health Maintenance Due   Topic Date Due    Lipid Panel  Never done    DEXA Scan  Never done    Shingles Vaccine (1 of 2) Never done    Pneumococcal Vaccines (Age 65+) (2 - PCV) 11/22/2005    TETANUS VACCINE  11/22/2014    COVID-19 Vaccine (2 - Booster for Cole series) 04/30/2021    Influenza Vaccine (1) 09/01/2022       Past Medical History:  Past Medical History:   Diagnosis Date    Asthma     Depression     Hyperlipidemia     IBS (irritable bowel syndrome)      Past Surgical History:   Procedure Laterality Date    BLADDER SUSPENSION      HYSTERECTOMY       Review of patient's allergies indicates:  No Known Allergies  Current Outpatient Medications on File Prior to Visit   Medication Sig Dispense Refill    meclizine (ANTIVERT) 25 mg tablet Take 1 tablet (25 mg total)  "by mouth 3 (three) times daily as needed for Nausea or Dizziness. 30 tablet 0    [DISCONTINUED] atorvastatin (LIPITOR) 20 MG tablet Take 20 mg by mouth once daily.      [DISCONTINUED] citalopram (CELEXA) 40 MG tablet Take 40 mg by mouth once daily.      [DISCONTINUED] LINZESS 145 mcg Cap capsule Take 145 mcg by mouth once daily.      [DISCONTINUED] SYMBICORT 160-4.5 mcg/actuation HFAA Inhale 1 puff into the lungs 4 (four) times daily as needed.       No current facility-administered medications on file prior to visit.     Social History     Socioeconomic History    Marital status:     Number of children: 4   Tobacco Use    Smoking status: Never    Smokeless tobacco: Never   Substance and Sexual Activity    Alcohol use: Not Currently    Drug use: Never     Family History   Problem Relation Age of Onset    No Known Problems Mother     Cancer Father     Cancer Son        Review of Systems   All other systems reviewed and are negative.    Objective:   /83 (BP Location: Left arm, Patient Position: Sitting, BP Method: Medium (Automatic))   Pulse 87   Temp 98.1 °F (36.7 °C) (Oral)   Resp 20   Ht 5' 2" (1.575 m)   Wt 50.3 kg (111 lb)   SpO2 97%   BMI 20.30 kg/m²     Physical Exam  Vitals (elderly frail, mild dementia, baseline, unchanged exam) and nursing note reviewed.   Constitutional:       Appearance: Normal appearance. She is normal weight.   HENT:      Head: Normocephalic and atraumatic.      Right Ear: Tympanic membrane, ear canal and external ear normal.      Left Ear: Tympanic membrane, ear canal and external ear normal.      Nose: Nose normal.      Mouth/Throat:      Mouth: Mucous membranes are moist.      Pharynx: Oropharynx is clear.   Eyes:      Extraocular Movements: Extraocular movements intact.      Conjunctiva/sclera: Conjunctivae normal.      Pupils: Pupils are equal, round, and reactive to light.   Cardiovascular:      Rate and Rhythm: Normal rate and regular rhythm.      Pulses: " Normal pulses.      Heart sounds: Normal heart sounds.   Pulmonary:      Effort: Pulmonary effort is normal.      Breath sounds: Normal breath sounds.   Abdominal:      General: Abdomen is flat. Bowel sounds are normal.      Palpations: Abdomen is soft.   Musculoskeletal:         General: Normal range of motion.      Cervical back: Normal range of motion and neck supple.   Skin:     General: Skin is warm and dry.      Capillary Refill: Capillary refill takes less than 2 seconds.   Neurological:      General: No focal deficit present.      Mental Status: She is alert and oriented to person, place, and time. Mental status is at baseline.   Psychiatric:         Mood and Affect: Mood normal.         Behavior: Behavior normal.         Thought Content: Thought content normal.         Judgment: Judgment normal.       Procedures     Admission on 06/18/2022, Discharged on 06/20/2022   Component Date Value Ref Range Status    WBC 06/19/2022 6.3  4.5 - 11.5 x10(3)/mcL Final    RBC 06/19/2022 4.77  4.20 - 5.40 x10(6)/mcL Final    Hgb 06/19/2022 14.2  12.0 - 16.0 gm/dL Final    Hct 06/19/2022 41.9  37.0 - 47.0 % Final    MCV 06/19/2022 87.8  80.0 - 94.0 fL Final    MCH 06/19/2022 29.8  27.0 - 31.0 pg Final    MCHC 06/19/2022 33.9  33.0 - 36.0 mg/dL Final    RDW 06/19/2022 12.6  11.5 - 17.0 % Final    Platelet 06/19/2022 161  130 - 400 x10(3)/mcL Final    MPV 06/19/2022 9.8  9.4 - 12.4 fL Final    Neut % 06/19/2022 82.0  % Final    Lymph % 06/19/2022 12.3  % Final    Mono % 06/19/2022 4.6  % Final    Eos % 06/19/2022 0.0  % Final    Basophil % 06/19/2022 0.9  % Final    Lymph # 06/19/2022 0.78  0.6 - 4.6 x10(3)/mcL Final    Neut # 06/19/2022 5.2  2.1 - 9.2 x10(3)/mcL Final    Mono # 06/19/2022 0.29  0.1 - 1.3 x10(3)/mcL Final    Eos # 06/19/2022 0.00  0 - 0.9 x10(3)/mcL Final    Baso # 06/19/2022 0.06  0 - 0.2 x10(3)/mcL Final    IG# 06/19/2022 0.01  0 - 0.0155 x10(3)/mcL Final    IG% 06/19/2022 0.2  0 - 0.43 % Final    NRBC%  06/19/2022 0.0  % Final    Sodium Level 06/19/2022 140  136 - 145 mmol/L Final    Potassium Level 06/19/2022 4.2  3.5 - 5.1 mmol/L Final    Chloride 06/19/2022 102  98 - 107 mmol/L Final    Carbon Dioxide 06/19/2022 25  23 - 31 mmol/L Final    Glucose Level 06/19/2022 102  82 - 115 mg/dL Final    Blood Urea Nitrogen 06/19/2022 13.9  9.8 - 20.1 mg/dL Final    Creatinine 06/19/2022 0.75  0.55 - 1.02 mg/dL Final    Calcium Level Total 06/19/2022 9.3  8.4 - 10.2 mg/dL Final    Protein Total 06/19/2022 6.9  5.8 - 7.6 gm/dL Final    Albumin Level 06/19/2022 3.7  3.4 - 4.8 gm/dL Final    Globulin 06/19/2022 3.2  2.4 - 3.5 gm/dL Final    Albumin/Globulin Ratio 06/19/2022 1.2  1.1 - 2.0 ratio Final    Bilirubin Total 06/19/2022 0.8  <=1.5 mg/dL Final    Alkaline Phosphatase 06/19/2022 73  40 - 150 unit/L Final    Alanine Aminotransferase 06/19/2022 10  0 - 55 unit/L Final    Aspartate Aminotransferase 06/19/2022 23  5 - 34 unit/L Final    Estimated GFR-Non  06/19/2022 >60  mls/min/1.73/m2 Final    Troponin-I 06/19/2022 0.022  0.000 - 0.045 ng/mL Final    Natriuretic Peptide 06/19/2022 128.1 (H)  <=100.0 pg/mL Final    SARS COV-2 MOLECULAR 06/19/2022 Negative  Negative Final    Color, UA 06/19/2022 Yellow  Yellow, Colorless, Other, Clear Final    Appearance, UA 06/19/2022 Clear  Clear Final    Specific Gravity, UA 06/19/2022 1.020   Final    pH, UA 06/19/2022 6.0  5.0, 5.5, 6.0, 6.5, 7.0, 7.5, 8.0, 8.5 Final    Protein, UA 06/19/2022 Negative  Negative, 300  mg/dL Final    Glucose, UA 06/19/2022 Negative  Negative, Normal mg/dL Final    Ketones, UA 06/19/2022 Trace (A)  Negative, +1, +2, +3, +4, +5, >=160, >=80 mg/dL Final    Blood, UA 06/19/2022 Trace (A)  Negative unit/L Final    Bilirubin, UA 06/19/2022 Negative  Negative mg/dL Final    Urobilinogen, UA 06/19/2022 0.2  0.2, 1.0, Normal mg/dL Final    Nitrites, UA 06/19/2022 Negative  Negative Final    Leukocyte Esterase, UA 06/19/2022 Negative  Negative, 75   unit/L Final    Bacteria, UA 06/19/2022 Few (A)  None Seen, Rare, Occasional /HPF Final    RBC, UA 06/19/2022 0-2  None Seen, 0-2, 3-5, 0-5 /HPF Final    WBC, UA 06/19/2022 0-2  None Seen, 0-2, 3-5, 0-5 /HPF Final    Squamous Epithelial Cells, UA 06/19/2022 Few (A)  None Seen, Rare, Occasional, Occ /HPF Final   Admission on 06/17/2022, Discharged on 06/17/2022   Component Date Value Ref Range Status    Sodium Level 06/17/2022 137  136 - 145 mmol/L Final    Potassium Level 06/17/2022 3.7  3.5 - 5.1 mmol/L Final    Chloride 06/17/2022 98  98 - 107 mmol/L Final    Carbon Dioxide 06/17/2022 26  23 - 31 mmol/L Final    Glucose Level 06/17/2022 124 (H)  82 - 115 mg/dL Final    Blood Urea Nitrogen 06/17/2022 19.6  9.8 - 20.1 mg/dL Final    Creatinine 06/17/2022 0.99  0.55 - 1.02 mg/dL Final    Calcium Level Total 06/17/2022 9.9  8.4 - 10.2 mg/dL Final    Protein Total 06/17/2022 7.0  5.8 - 7.6 gm/dL Final    Albumin Level 06/17/2022 4.0  3.4 - 4.8 gm/dL Final    Globulin 06/17/2022 3.0  2.4 - 3.5 gm/dL Final    Albumin/Globulin Ratio 06/17/2022 1.3  1.1 - 2.0 ratio Final    Bilirubin Total 06/17/2022 0.6  <=1.5 mg/dL Final    Alkaline Phosphatase 06/17/2022 84  40 - 150 unit/L Final    Alanine Aminotransferase 06/17/2022 11  0 - 55 unit/L Final    Aspartate Aminotransferase 06/17/2022 20  5 - 34 unit/L Final    Estimated GFR-Non  06/17/2022 57  mls/min/1.73/m2 Final    WBC 06/17/2022 14.2 (H)  4.5 - 11.5 x10(3)/mcL Final    RBC 06/17/2022 5.08  4.20 - 5.40 x10(6)/mcL Final    Hgb 06/17/2022 15.1  12.0 - 16.0 gm/dL Final    Hct 06/17/2022 44.3  37.0 - 47.0 % Final    MCV 06/17/2022 87.2  80.0 - 94.0 fL Final    MCH 06/17/2022 29.7  27.0 - 31.0 pg Final    MCHC 06/17/2022 34.1  33.0 - 36.0 mg/dL Final    RDW 06/17/2022 12.8  11.5 - 17.0 % Final    Platelet 06/17/2022 193  130 - 400 x10(3)/mcL Final    MPV 06/17/2022 10.4  9.4 - 12.4 fL Final    Neut % 06/17/2022 91.8  % Final    Lymph % 06/17/2022 3.3  %  Final    Mono % 06/17/2022 4.0  % Final    Eos % 06/17/2022 0.0  % Final    Basophil % 06/17/2022 0.3  % Final    Lymph # 06/17/2022 0.46 (L)  0.6 - 4.6 x10(3)/mcL Final    Neut # 06/17/2022 13.0 (H)  2.1 - 9.2 x10(3)/mcL Final    Mono # 06/17/2022 0.56  0.1 - 1.3 x10(3)/mcL Final    Eos # 06/17/2022 0.00  0 - 0.9 x10(3)/mcL Final    Baso # 06/17/2022 0.04  0 - 0.2 x10(3)/mcL Final    IG# 06/17/2022 0.08 (H)  0 - 0.0155 x10(3)/mcL Final    IG% 06/17/2022 0.6 (H)  0 - 0.43 % Final    NRBC% 06/17/2022 0.0  % Final    Troponin-I 06/17/2022 <0.010  0.000 - 0.045 ng/mL Final    QRY    Color, UA 06/17/2022 Yellow  Yellow, Colorless, Other, Clear Final    Appearance, UA 06/17/2022 Clear  Clear Final    Specific Gravity, UA 06/17/2022 1.020  1.001 - 1.030 Final    pH, UA 06/17/2022 5.5  5.0, 5.5, 6.0, 6.5, 7.0, 7.5, 8.0, 8.5 Final    Protein, UA 06/17/2022 Trace (A)  Negative, 300  mg/dL Final    Glucose, UA 06/17/2022 Negative  Negative, Normal mg/dL Final    Ketones, UA 06/17/2022 Trace (A)  Negative, +1, +2, +3, +4, +5, >=160, >=80 mg/dL Final    Blood, UA 06/17/2022 1+ (A)  Negative unit/L Final    Bilirubin, UA 06/17/2022 Negative  Negative mg/dL Final    Urobilinogen, UA 06/17/2022 1.0  0.2, 1.0, Normal mg/dL Final    Nitrites, UA 06/17/2022 Negative  Negative Final    Leukocyte Esterase, UA 06/17/2022 2+ (A)  Negative, 75  unit/L Final    RBC, UA 06/17/2022 6 (H)  <=5 /HPF Final    WBC, UA 06/17/2022 35 (H)  <=5 /HPF Final    Squamous Epithelial Cells, UA 06/17/2022 <4  <=4 /HPF Final    Bacteria, UA 06/17/2022 None Seen  None Seen, Rare, Occasional /HPF Final    Mucous, UA 06/17/2022 Small (A)  None Seen /LPF Final    Urine Culture 06/17/2022 Multiple organisms present indicate probable contamination. Recommend recollection.   Final    Urine Culture 06/17/2022 25,000-50,000 colonies/ml Gram-positive coccus probable staph (A)   Final    Urine Culture 06/17/2022 10,000 - 25,000 colonies/ml Streptococcus agalactiae  (Group B) (A)   Final    Urine Culture 06/17/2022 Less than 10,000 colonies/ml Gram-negative Rods (A)   Final    POCT Glucose 06/17/2022 120 (H)  70 - 110 mg/dL Final       X-Ray Chest 1 View  Narrative: EXAMINATION:  XR CHEST 1 VIEW    CLINICAL HISTORY:  Weakness    TECHNIQUE:  One view    COMPARISON:  November 29, 2018.    FINDINGS:  Cardiopericardial silhouette is within normal limits. Lungs are without dense focal or segmental consolidation, congestive process, pleural effusions or pneumothorax.  Impression: NO ACUTE CARDIOPULMONARY PROCESS IDENTIFIED.    Electronically signed by: Cam Romero  Date:    06/17/2022  Time:    19:45  CT Cervical Spine Without Contrast  Narrative: EXAMINATION:  CT CERVICAL SPINE WITHOUT CONTRAST    CLINICAL HISTORY:  Trauma.    TECHNIQUE:  Multidetector axial images were performed of the cervical spine without and.  Images were reconstructed.    Automated exposure control was utilized to minimize radiation dose.  DLP 1185.    COMPARISON:    None available.    FINDINGS:  There is grade 1 anterolisthesis of C4 on C5 which is degenerative is, cervical vertebrae stature alignment is preserved.  No acute fracture or malalignment identified.  There are degenerative changes which cause ventral impression upon the thecal sac and narrowings of the neural foramen.  There is no prevertebral soft tissue prominence.    This study does not exclude the possibility of intrathecal soft tissue, ligamentous or vascular injury.  Impression: No acute fracture or malalignment identified.    Electronically signed by: Cam Romero  Date:    06/17/2022  Time:    19:12  CT Head Without Contrast  Narrative: EXAMINATION:  CT HEAD WITHOUT CONTRAST    CLINICAL HISTORY:  fall, dizziness, vomiting;    TECHNIQUE:  Sequential axial images were performed of the brain without contrast.    Dose product length of 1185 mGycm. Automated exposure control was utilized to minimize radiation dose.    COMPARISON:  None  available.    FINDINGS:  There is no intracranial mass effect, midline shift, hydrocephalus or hemorrhage. There is no sulcal effacement or low attenuation changes to suggest recent large vessel territory infarction. Chronic appearing periventricular and subcortical white matter low attenuation changes are present and are consistent with chronic microangiopathic ischemia. The ventricular system and sulcal markings prominence is consistent with atrophy. There is no acute extra axial fluid collection.  There is no acute depressed calvarial fracture.    Bilateral with thickening of the maxillary and ethmoidal air cells.  Mastoid air cells aeration is optimal.  Impression: No acute intracranial traumatic findings identified.    Electronically signed by: Cam Romero  Date:    06/17/2022  Time:    19:08       Assessment:     1. Primary hypertension    2. Mixed hyperlipidemia    3. Chronic constipation    4. Simple chronic bronchitis    5. Moderate persistent asthma without complication    6. Recurrent major depressive disorder, in partial remission    7. Need for vaccination      Plan:   I have changed Elizabeth Navarrete's atorvastatin, citalopram, LINZESS, and SYMBICORT. I am also having her maintain her meclizine.  Problem List Items Addressed This Visit       Chronic constipation (Chronic)    Relevant Medications    LINZESS 145 mcg Cap capsule    Chronic obstructive pulmonary disease (Chronic)    Relevant Medications    SYMBICORT 160-4.5 mcg/actuation HFAA    Hypertension - Primary (Chronic)    Mixed hyperlipidemia (Chronic)    Relevant Medications    atorvastatin (LIPITOR) 20 MG tablet    Recurrent major depressive disorder, in partial remission (Chronic)    Relevant Medications    citalopram (CELEXA) 40 MG tablet    Unspecified asthma, uncomplicated (Chronic)    Relevant Medications    SYMBICORT 160-4.5 mcg/actuation HFAA     Other Visit Diagnoses       Need for vaccination        Relevant Orders    (In Office  Administered) Pneumococcal Conjugate Vaccine (20 Valent) (IM)    Influenza (FLUAD) - Quadrivalent (Adjuvanted) *Preferred* (65+) (PF)          Follow up in about 6 months (around 5/29/2023) for AWVJayla Johnson was seen today for follow-up and flu vaccine.    Diagnoses and all orders for this visit:    Primary hypertension   Continue current prescription medications. Refills as needed   Condition/Symptoms controlled/stable   Surveillance labs ordered as needed, or reviewed in visit.   RTC 6 months (as scheduled) or PRN    Mixed hyperlipidemia  -     atorvastatin (LIPITOR) 20 MG tablet; Take 1 tablet (20 mg total) by mouth once daily.   Continue current prescription medications. Refills as needed   Condition/Symptoms controlled/stable   Surveillance labs ordered as needed, or reviewed in visit.   RTC 6 months (as scheduled) or PRN    Chronic constipation  -     LINZESS 145 mcg Cap capsule; Take 1 capsule (145 mcg total) by mouth once daily.   Continue current prescription medications. Refills as needed   Condition/Symptoms controlled/stable   Surveillance labs ordered as needed, or reviewed in visit.   RTC 6 months (as scheduled) or PRN    Simple chronic bronchitis  -     SYMBICORT 160-4.5 mcg/actuation HFAA; Inhale 1 puff into the lungs every 12 (twelve) hours.   Continue current prescription medications. Refills as needed   Condition/Symptoms controlled/stable   Surveillance labs ordered as needed, or reviewed in visit.   RTC 6 months (as scheduled) or PRN    Moderate persistent asthma without complication  -     SYMBICORT 160-4.5 mcg/actuation HFAA; Inhale 1 puff into the lungs every 12 (twelve) hours.   Continue current prescription medications. Refills as needed   Condition/Symptoms controlled/stable   Surveillance labs ordered as needed, or reviewed in visit.   RTC 6 months (as scheduled) or PRN    Recurrent major depressive disorder, in partial remission  -     citalopram (CELEXA) 40 MG tablet; Take 1 tablet (40  mg total) by mouth once daily.   Continue current prescription medications. Refills as needed   Condition/Symptoms controlled/stable   Surveillance labs ordered as needed, or reviewed in visit.   RTC 6 months (as scheduled) or PRN    Need for vaccination  -     (In Office Administered) Pneumococcal Conjugate Vaccine (20 Valent) (IM)  -     Influenza (FLUAD) - Quadrivalent (Adjuvanted) *Preferred* (65+) (PF)      Medications Ordered This Encounter   Medications    atorvastatin (LIPITOR) 20 MG tablet     Sig: Take 1 tablet (20 mg total) by mouth once daily.     Dispense:  90 tablet     Refill:  1    citalopram (CELEXA) 40 MG tablet     Sig: Take 1 tablet (40 mg total) by mouth once daily.     Dispense:  90 tablet     Refill:  1    LINZESS 145 mcg Cap capsule     Sig: Take 1 capsule (145 mcg total) by mouth once daily.     Dispense:  90 capsule     Refill:  1    SYMBICORT 160-4.5 mcg/actuation HFAA     Sig: Inhale 1 puff into the lungs every 12 (twelve) hours.     Dispense:  10.2 g     Refill:  5     [unfilled]  Orders Placed This Encounter   Procedures    (In Office Administered) Pneumococcal Conjugate Vaccine (20 Valent) (IM)    Influenza (FLUAD) - Quadrivalent (Adjuvanted) *Preferred* (65+) (PF)       Medication List with Changes/Refills   Current Medications    MECLIZINE (ANTIVERT) 25 MG TABLET    Take 1 tablet (25 mg total) by mouth 3 (three) times daily as needed for Nausea or Dizziness.   Changed and/or Refilled Medications    Modified Medication Previous Medication    ATORVASTATIN (LIPITOR) 20 MG TABLET atorvastatin (LIPITOR) 20 MG tablet       Take 1 tablet (20 mg total) by mouth once daily.    Take 20 mg by mouth once daily.    CITALOPRAM (CELEXA) 40 MG TABLET citalopram (CELEXA) 40 MG tablet       Take 1 tablet (40 mg total) by mouth once daily.    Take 40 mg by mouth once daily.    LINZESS 145 MCG CAP CAPSULE LINZESS 145 mcg Cap capsule       Take 1 capsule (145 mcg total) by mouth once daily.    Take 145  mcg by mouth once daily.    SYMBICORT 160-4.5 MCG/ACTUATION HFAA SYMBICORT 160-4.5 mcg/actuation HFAA       Inhale 1 puff into the lungs every 12 (twelve) hours.    Inhale 1 puff into the lungs 4 (four) times daily as needed.      Medication List with Changes/Refills   Current Medications    MECLIZINE (ANTIVERT) 25 MG TABLET    Take 1 tablet (25 mg total) by mouth 3 (three) times daily as needed for Nausea or Dizziness.       Start Date: 6/20/2022 End Date: --   Changed and/or Refilled Medications    Modified Medication Previous Medication    ATORVASTATIN (LIPITOR) 20 MG TABLET atorvastatin (LIPITOR) 20 MG tablet       Take 1 tablet (20 mg total) by mouth once daily.    Take 20 mg by mouth once daily.       Start Date: 11/29/2022End Date: 5/28/2023    Start Date: 4/1/2022  End Date: 11/29/2022    CITALOPRAM (CELEXA) 40 MG TABLET citalopram (CELEXA) 40 MG tablet       Take 1 tablet (40 mg total) by mouth once daily.    Take 40 mg by mouth once daily.       Start Date: 11/29/2022End Date: 5/28/2023    Start Date: 3/9/2022  End Date: 11/29/2022    LINZESS 145 MCG CAP CAPSULE LINZESS 145 mcg Cap capsule       Take 1 capsule (145 mcg total) by mouth once daily.    Take 145 mcg by mouth once daily.       Start Date: 11/29/2022End Date: 5/28/2023    Start Date: 3/22/2022 End Date: 11/29/2022    SYMBICORT 160-4.5 MCG/ACTUATION HFAA SYMBICORT 160-4.5 mcg/actuation HFAA       Inhale 1 puff into the lungs every 12 (twelve) hours.    Inhale 1 puff into the lungs 4 (four) times daily as needed.       Start Date: 11/29/2022End Date: 5/28/2023    Start Date: 5/2/2022  End Date: 11/29/2022

## 2023-06-22 ENCOUNTER — HOSPITAL ENCOUNTER (EMERGENCY)
Facility: HOSPITAL | Age: 83
Discharge: HOME | End: 2023-06-22
Attending: EMERGENCY MEDICINE
Payer: MEDICARE

## 2023-06-22 VITALS
BODY MASS INDEX: 19.92 KG/M2 | SYSTOLIC BLOOD PRESSURE: 161 MMHG | DIASTOLIC BLOOD PRESSURE: 93 MMHG | WEIGHT: 108.94 LBS | RESPIRATION RATE: 20 BRPM | TEMPERATURE: 98 F | HEART RATE: 87 BPM | OXYGEN SATURATION: 96 %

## 2023-06-22 DIAGNOSIS — R42 VERTIGO: Primary | ICD-10-CM

## 2023-06-22 DIAGNOSIS — R42 DIZZINESS: ICD-10-CM

## 2023-06-22 LAB
ANION GAP SERPL CALC-SCNC: 10 MEQ/L
APPEARANCE UR: CLEAR
BACTERIA #/AREA URNS AUTO: ABNORMAL /HPF
BASOPHILS # BLD AUTO: 0.09 X10(3)/MCL
BASOPHILS NFR BLD AUTO: 1.5 %
BILIRUB UR QL STRIP.AUTO: NEGATIVE MG/DL
BNP BLD-MCNC: 57.4 PG/ML
BUN SERPL-MCNC: 22.9 MG/DL (ref 9.8–20.1)
CALCIUM SERPL-MCNC: 9.9 MG/DL (ref 8.4–10.2)
CHLORIDE SERPL-SCNC: 105 MMOL/L (ref 98–107)
CO2 SERPL-SCNC: 26 MMOL/L (ref 23–31)
COLOR UR: ABNORMAL
CREAT SERPL-MCNC: 1.26 MG/DL (ref 0.55–1.02)
CREAT/UREA NIT SERPL: 18
EOSINOPHIL # BLD AUTO: 0.2 X10(3)/MCL (ref 0–0.9)
EOSINOPHIL NFR BLD AUTO: 3.2 %
ERYTHROCYTE [DISTWIDTH] IN BLOOD BY AUTOMATED COUNT: 12.6 % (ref 11.5–17)
GFR SERPLBLD CREATININE-BSD FMLA CKD-EPI: 42 MLS/MIN/1.73/M2
GLUCOSE SERPL-MCNC: 102 MG/DL (ref 82–115)
GLUCOSE UR QL STRIP.AUTO: NORMAL MG/DL
HCT VFR BLD AUTO: 50.3 % (ref 37–47)
HGB BLD-MCNC: 16.4 G/DL (ref 12–16)
HYALINE CASTS #/AREA URNS LPF: ABNORMAL /LPF
IMM GRANULOCYTES # BLD AUTO: 0.01 X10(3)/MCL (ref 0–0.04)
IMM GRANULOCYTES NFR BLD AUTO: 0.2 %
KETONES UR QL STRIP.AUTO: NEGATIVE MG/DL
LEUKOCYTE ESTERASE UR QL STRIP.AUTO: 250 UNIT/L
LIPASE SERPL-CCNC: 46 U/L
LYMPHOCYTES # BLD AUTO: 1.59 X10(3)/MCL (ref 0.6–4.6)
LYMPHOCYTES NFR BLD AUTO: 25.7 %
MAGNESIUM SERPL-MCNC: 2.3 MG/DL (ref 1.6–2.6)
MCH RBC QN AUTO: 29.1 PG (ref 27–31)
MCHC RBC AUTO-ENTMCNC: 32.6 G/DL (ref 33–36)
MCV RBC AUTO: 89.2 FL (ref 80–94)
MONOCYTES # BLD AUTO: 0.35 X10(3)/MCL (ref 0.1–1.3)
MONOCYTES NFR BLD AUTO: 5.7 %
MUCOUS THREADS URNS QL MICRO: ABNORMAL /LPF
NEUTROPHILS # BLD AUTO: 3.94 X10(3)/MCL (ref 2.1–9.2)
NEUTROPHILS NFR BLD AUTO: 63.7 %
NITRITE UR QL STRIP.AUTO: NEGATIVE
NRBC BLD AUTO-RTO: 0 %
PH UR STRIP.AUTO: 7 [PH]
PLATELET # BLD AUTO: 209 X10(3)/MCL (ref 130–400)
PMV BLD AUTO: 9.4 FL (ref 7.4–10.4)
POTASSIUM SERPL-SCNC: 4.2 MMOL/L (ref 3.5–5.1)
PROT UR QL STRIP.AUTO: ABNORMAL MG/DL
RBC # BLD AUTO: 5.64 X10(6)/MCL (ref 4.2–5.4)
RBC #/AREA URNS AUTO: ABNORMAL /HPF
RBC UR QL AUTO: NEGATIVE UNIT/L
SODIUM SERPL-SCNC: 141 MMOL/L (ref 136–145)
SP GR UR STRIP.AUTO: 1.02
SQUAMOUS #/AREA URNS LPF: ABNORMAL /HPF
TROPONIN I SERPL-MCNC: <0.01 NG/ML (ref 0–0.04)
TROPONIN I SERPL-MCNC: <0.01 NG/ML (ref 0–0.04)
UROBILINOGEN UR STRIP-ACNC: NORMAL MG/DL
WBC # SPEC AUTO: 6.18 X10(3)/MCL (ref 4.5–11.5)
WBC #/AREA URNS AUTO: ABNORMAL /HPF

## 2023-06-22 PROCEDURE — 25000003 PHARM REV CODE 250: Performed by: EMERGENCY MEDICINE

## 2023-06-22 PROCEDURE — 83690 ASSAY OF LIPASE: CPT | Performed by: EMERGENCY MEDICINE

## 2023-06-22 PROCEDURE — 80048 BASIC METABOLIC PNL TOTAL CA: CPT | Performed by: EMERGENCY MEDICINE

## 2023-06-22 PROCEDURE — 84484 ASSAY OF TROPONIN QUANT: CPT | Performed by: EMERGENCY MEDICINE

## 2023-06-22 PROCEDURE — 96361 HYDRATE IV INFUSION ADD-ON: CPT

## 2023-06-22 PROCEDURE — 85025 COMPLETE CBC W/AUTO DIFF WBC: CPT | Performed by: EMERGENCY MEDICINE

## 2023-06-22 PROCEDURE — 81001 URINALYSIS AUTO W/SCOPE: CPT | Performed by: EMERGENCY MEDICINE

## 2023-06-22 PROCEDURE — 83880 ASSAY OF NATRIURETIC PEPTIDE: CPT | Performed by: EMERGENCY MEDICINE

## 2023-06-22 PROCEDURE — 93005 ELECTROCARDIOGRAM TRACING: CPT

## 2023-06-22 PROCEDURE — 83735 ASSAY OF MAGNESIUM: CPT | Performed by: EMERGENCY MEDICINE

## 2023-06-22 PROCEDURE — 96360 HYDRATION IV INFUSION INIT: CPT

## 2023-06-22 PROCEDURE — 99285 EMERGENCY DEPT VISIT HI MDM: CPT | Mod: 25

## 2023-06-22 RX ORDER — MECLIZINE HYDROCHLORIDE 25 MG/1
25 TABLET ORAL
Status: COMPLETED | OUTPATIENT
Start: 2023-06-22 | End: 2023-06-22

## 2023-06-22 RX ORDER — SODIUM CHLORIDE 9 MG/ML
500 INJECTION, SOLUTION INTRAVENOUS
Status: COMPLETED | OUTPATIENT
Start: 2023-06-22 | End: 2023-06-22

## 2023-06-22 RX ADMIN — MECLIZINE HYDROCHLORIDE 25 MG: 25 TABLET ORAL at 06:06

## 2023-06-22 RX ADMIN — SODIUM CHLORIDE 500 ML: 9 INJECTION, SOLUTION INTRAVENOUS at 07:06

## 2023-06-22 NOTE — ED PROVIDER NOTES
ED PROVIDER NOTE  6/22/2023    CHIEF COMPLAINT:   Chief Complaint   Patient presents with    Dizziness    Nausea     For 4 days       HISTORY OF PRESENT ILLNESS:   Elizabeth Navarrete is a 83 y.o. female who presents with chief complaint Dizziness. Onset was about a week ago whenever she began having dizziness characterized as room spinning that she states is constant aggravated with changes in head position, improved with rest.  Associated symptoms of nausea.  Reports similar history of the same a year ago for which she was on meclizine but ran out of this medication.  Denies numbness or weakness down 1 side of her body, changes in vision or hearing, trouble with speech or swallowing, headache, neck pain or stiffness, abdominal pain, chest pain, shortness of breath.    The history is provided by the patient.       REVIEW OF SYSTEMS: as noted in the HPI.  NURSING NOTES REVIEWED      PAST MEDICAL/SURGICAL HISTORY:   Past Medical History:   Diagnosis Date    Asthma     Depression     Hyperlipidemia     IBS (irritable bowel syndrome)       Past Surgical History:   Procedure Laterality Date    BLADDER SUSPENSION      HYSTERECTOMY         FAMILY HISTORY:   Family History   Problem Relation Age of Onset    No Known Problems Mother     Cancer Father     Cancer Son        SOCIAL HISTORY:   Social History     Tobacco Use    Smoking status: Never    Smokeless tobacco: Never   Substance Use Topics    Alcohol use: Not Currently    Drug use: Never       ALLERGIES: Review of patient's allergies indicates:  No Known Allergies    PHYSICAL EXAM:  Initial Vitals [06/22/23 1837]   BP Pulse Resp Temp SpO2   (!) 157/100 104 20 98.1 °F (36.7 °C) 100 %      MAP       --         Physical Exam    Nursing note and vitals reviewed.  Constitutional: She appears well-developed and well-nourished.   HENT:   Head: Normocephalic and atraumatic.   Mouth/Throat: Uvula is midline and mucous membranes are normal.   Eyes: EOM are normal. Pupils are equal,  round, and reactive to light.   Neck: Trachea normal. Neck supple.   Cardiovascular:  Normal rate, regular rhythm and normal pulses.           Pulmonary/Chest: Effort normal and breath sounds normal.   Abdominal: Abdomen is soft. Bowel sounds are normal. There is no abdominal tenderness. There is no rebound and no guarding.   Musculoskeletal:         General: Normal range of motion.      Cervical back: Neck supple.     Neurological: She is alert and oriented to person, place, and time. She has normal strength. No cranial nerve deficit or sensory deficit. She displays a negative Romberg sign. GCS eye subscore is 4. GCS verbal subscore is 5. GCS motor subscore is 6.   Normal finger-nose-finger and heel-shin test.   Skin: Skin is warm and dry.   Psychiatric: She has a normal mood and affect. Her speech is normal. Thought content normal.       RESULTS:  Labs Reviewed   BASIC METABOLIC PANEL - Abnormal; Notable for the following components:       Result Value    Blood Urea Nitrogen 22.9 (*)     Creatinine 1.26 (*)     All other components within normal limits   URINALYSIS, REFLEX TO URINE CULTURE - Abnormal; Notable for the following components:    Protein, UA Trace (*)     Leukocyte Esterase,  (*)     Mucous, UA Trace (*)     All other components within normal limits   CBC WITH DIFFERENTIAL - Abnormal; Notable for the following components:    RBC 5.64 (*)     Hgb 16.4 (*)     Hct 50.3 (*)     MCHC 32.6 (*)     All other components within normal limits   B-TYPE NATRIURETIC PEPTIDE - Normal   MAGNESIUM - Normal   LIPASE - Normal   TROPONIN I - Normal   TROPONIN I - Normal   CBC W/ AUTO DIFFERENTIAL    Narrative:     The following orders were created for panel order CBC auto differential.  Procedure                               Abnormality         Status                     ---------                               -----------         ------                     CBC with Differential[[id number]]        Abnormal             Final result                 Please view results for these tests on the individual orders.   EXTRA TUBES    Narrative:     The following orders were created for panel order EXTRA TUBES.  Procedure                               Abnormality         Status                     ---------                               -----------         ------                     Light Blue Top Hold[[id number]]                              In process                   Please view results for these tests on the individual orders.   LIGHT BLUE TOP HOLD     Imaging Results              CT Head Without Contrast (Final result)  Result time 06/22/23 20:33:46      Final result by Cam Romero MD (06/22/23 20:33:46)                   Impression:      1.  No acute intracranial findings identified.    2.  Chronic microangiopathic ischemia and atrophy.      Electronically signed by: Cam Romero  Date:    06/22/2023  Time:    20:33               Narrative:    EXAMINATION:  CT HEAD WITHOUT CONTRAST    CLINICAL HISTORY:  Dizziness, persistent/recurrent, cardiac or vascular cause suspected;    TECHNIQUE:  Sequential axial images were performed of the brain without contrast.    Dose product length of 834 mGycm. Automated exposure control was utilized to minimize radiation dose.    COMPARISON:  None available.    FINDINGS:  There is no intracranial mass effect, midline shift, hydrocephalus or hemorrhage. There is no sulcal effacement or low attenuation changes to suggest recent large vessel territory infarction. Chronic appearing periventricular and subcortical white matter low attenuation changes are present and are consistent with chronic microangiopathic ischemia. The ventricular system and sulcal markings prominence is consistent with atrophy. There is no acute extra axial fluid collection.  There is some mucoperiosteal thickening of the maxillary and ethmoidal air cells.  Mastoid air cells aeration is optimal.                        Wet Read by  Brien Estes DO (06/22/23 20:21:34, [facility], Emergency Medicine)    No extra-axial hemorrhage.                                    PROCEDURES:  Procedures    ECG:  EKG Readings: (Independently Interpreted)   Initial Reading: No STEMI. Previous EKG: Compared with most recent EKG Previous EKG Date: 6/19/22. Rhythm: Sinus Arrhythmia. Heart Rate: 87. Ectopy: PACs. ST Segment Depression: II, III, AVF, V4, V5 and V6. Axis: Normal. Other Impression: incomplete RBBB     ED COURSE AND MEDICAL DECISION MAKING:  Medications   meclizine tablet 25 mg (25 mg Oral Given 6/22/23 1842)   0.9%  NaCl infusion (0 mLs Intravenous Stopped 6/22/23 2115)     ED Course as of 06/24/23 0115   Thu Jun 22, 2023 1926 Creatinine(!): 1.26  Increased from 0.75 [IB]   2120 Reports resolution after meclizine, ambulating steady without difficulty. [IB]      ED Course User Index  [IB] Brien Estes DO     Additional MDM:     NIH Stroke Scale:   Interval = baseline (upon arrival/admit)  Level of consciousness = 0 - alert  LOC questions = 1 - answers one correctly  LOC commands = 0 - performs both correctly  Best gaze = 0 - normal  Visual = 0 - no visual loss  Facial palsy = 0 - normal  Motor left arm =  0 - no drift  Motor right arm =  0 - no drift  Motor left leg = 0 - no drift  Motor right leg =  0 - no drift  Limb ataxia = 0 - absent  Sensory = 0 - normal  Best language = 0 - no aphasia  Dysarthria = 0 - normal articulation  Extinction and inattention = 0 - no neglect  NIH Stroke Scale Total = 1  Medical Decision Making  83-year-old female who presents with vertigo that has been persistent over the past week.  Review of medical record shows that she is been diagnosed with vertigo in the past for which she was on meclizine, but states that she ran out of this medication.  She is no focal neurologic deficit on examination.  CT head shows no acute intracranial process.  ECG shows no evidence of STEMI, there are some ST  depressions in the inferior leads, no STEMI.  Initial and repeat troponin are within normal limits.  CBC shows no leukocytosis or leukopenia, hemoglobin 16.4 with a hematocrit 50.3 suggesting hemoconcentration.  BNP shows no significant electrolyte abnormalities with creatinine 1.26 which is increased from 0.75 a year ago.  She was given IV hydration with normal saline along with meclizine with resolution of her vertigo, ambulated in the ED with steady gait.  We will provide refill on her meclizine, however I informed them that this medication is available over-the-counter if needed.  Recommend close outpatient follow up with her PCP.  Given strict ED return precautions. I have spoken with the patient and/or caregivers. I have explained the patient's condition, diagnoses and treatment plan based on the information available to me at this time. I have answered the patient's and/or caregiver's questions and addressed any concerns. The patient and/or caregivers have as good an understanding of the patient's diagnosis, condition and treatment plan as can be expected at this point. The vital signs have been stable. The patient's condition is stable and appropriate for discharge from the emergency department.     The patient will pursue further outpatient evaluation with the primary care physician or other designated or consulting physician as outlined in the discharge instructions. The patient and/or caregivers are agreeable to this plan of care and follow-up instructions have been explained in detail. The patient and/or caregivers have received these instructions in written format and have expressed an understanding of the discharge instructions. The patient and/or caregivers are aware that any significant change in condition or worsening of symptoms should prompt an immediate return to this or the closest emergency department or a call to 911.    Problems Addressed:  Dizziness: complicated acute illness or injury      Details: Differential Diagnoses include but not limited to: Benign Paroxysmal Positional Vertigo, Labyrinthitis, Schwannoma, Meniere's Disease, Vertebrobasilar Artery Dissection, Stroke, Myocardial Infarction, Toxic Ingestion, Adverse Drug Reaction.    Amount and/or Complexity of Data Reviewed  External Data Reviewed: labs and ECG.  Labs: ordered. Decision-making details documented in ED Course.  Radiology: ordered and independent interpretation performed. Decision-making details documented in ED Course.  ECG/medicine tests: ordered and independent interpretation performed. Decision-making details documented in ED Course.    Risk  Prescription drug management.        CLINICAL IMPRESSION:  1. Vertigo    2. Dizziness        DISPOSITION:   ED Disposition Condition    Discharge Stable            ED Prescriptions    None       Follow-up Information       Follow up With Specialties Details Why Contact Info    [facility] Emergency Medicine  If symptoms worsen [address] [facility] [place] [address] [phone]               Brien Estes DO  06/24/23 0111

## 2023-06-23 NOTE — ED NOTES
Shift change care assumed. Sitting upright in bed. Family at bedside. Alert, awake and oriented. Reports dizziness with head movement, as in room spins. Noted new orders for CT scan and IVF. Voices no new needs.

## 2024-01-21 ENCOUNTER — HOSPITAL ENCOUNTER (EMERGENCY)
Facility: HOSPITAL | Age: 84
Discharge: HOME | End: 2024-01-21
Attending: EMERGENCY MEDICINE
Payer: MEDICARE

## 2024-01-21 VITALS
BODY MASS INDEX: 21.04 KG/M2 | TEMPERATURE: 98 F | DIASTOLIC BLOOD PRESSURE: 90 MMHG | SYSTOLIC BLOOD PRESSURE: 152 MMHG | RESPIRATION RATE: 16 BRPM | OXYGEN SATURATION: 95 % | HEART RATE: 91 BPM | HEIGHT: 61 IN | WEIGHT: 111.44 LBS

## 2024-01-21 DIAGNOSIS — N30.00 ACUTE CYSTITIS WITHOUT HEMATURIA: Primary | ICD-10-CM

## 2024-01-21 LAB
APPEARANCE UR: CLEAR
BACTERIA #/AREA URNS AUTO: ABNORMAL /HPF
BILIRUB UR QL STRIP.AUTO: ABNORMAL
GLUCOSE UR QL STRIP.AUTO: NORMAL
HYALINE CASTS #/AREA URNS LPF: ABNORMAL /LPF
KETONES UR QL STRIP.AUTO: NEGATIVE
LEUKOCYTE ESTERASE UR QL STRIP.AUTO: 75
MUCOUS THREADS URNS QL MICRO: ABNORMAL /LPF
NITRITE UR QL STRIP.AUTO: ABNORMAL
PH UR STRIP.AUTO: 6.5 [PH]
PROT UR QL STRIP.AUTO: NEGATIVE
RBC #/AREA URNS AUTO: ABNORMAL /HPF
RBC UR QL AUTO: NEGATIVE
SP GR UR STRIP.AUTO: 1.02 (ref 1–1.03)
SQUAMOUS #/AREA URNS LPF: ABNORMAL /HPF
UROBILINOGEN UR STRIP-ACNC: ABNORMAL
WBC #/AREA URNS AUTO: ABNORMAL /HPF

## 2024-01-21 PROCEDURE — 99283 EMERGENCY DEPT VISIT LOW MDM: CPT

## 2024-01-21 PROCEDURE — 81001 URINALYSIS AUTO W/SCOPE: CPT | Performed by: NURSE PRACTITIONER

## 2024-01-21 RX ORDER — NITROFURANTOIN 25; 75 MG/1; MG/1
100 CAPSULE ORAL 2 TIMES DAILY
Qty: 14 CAPSULE | Refills: 0 | Status: SHIPPED | OUTPATIENT
Start: 2024-01-21 | End: 2024-01-28

## 2024-01-22 NOTE — ED PROVIDER NOTES
Encounter Date: 1/21/2024       History     Chief Complaint   Patient presents with    Dysuria     Pt c/o uti x 1 week.  Reports taking AZO and its not working.  Pt also c/o pressure in sylvia lower back x 1 week.     The patient presents with dysuria.  The onset was 1 week ago.  The course/duration of symptoms is constant.  Radiating pain: none. The character of symptoms is dull.  The degree at onset was minimal.  The degree at present is minimal.  The exacerbating factor is none.  The relieving factor is none.  Risk factors consist of none.  Therapy today: over the counter medications.  Associated symptoms: voiding small amounts, increased frequency, denies fever, denies chills, denies nausea, denies vomiting, denies abdominal pain, denies back pain, denies pelvic pain, denies hematuria and denies vaginal discharge. She is here with her daughter.      Review of patient's allergies indicates:  No Known Allergies  Past Medical History:   Diagnosis Date    Asthma     Depression     Hyperlipidemia     IBS (irritable bowel syndrome)      Past Surgical History:   Procedure Laterality Date    BLADDER SUSPENSION      HYSTERECTOMY       Family History   Problem Relation Age of Onset    No Known Problems Mother     Cancer Father     Cancer Son      Social History     Tobacco Use    Smoking status: Never    Smokeless tobacco: Never   Substance Use Topics    Alcohol use: Not Currently    Drug use: Never     Review of Systems   Constitutional:  Negative for fever.   HENT:  Negative for sore throat.    Respiratory:  Negative for shortness of breath.    Cardiovascular:  Negative for chest pain.   Gastrointestinal:  Negative for nausea.   Genitourinary:  Positive for dysuria.   Musculoskeletal:  Negative for back pain.   Skin:  Negative for rash.   Neurological:  Negative for weakness.   Hematological:  Does not bruise/bleed easily.   All other systems reviewed and are negative.      Physical Exam     Initial Vitals [01/21/24 1722]    BP Pulse Resp Temp SpO2   (!) 152/90 91 16 97.9 °F (36.6 °C) 95 %      MAP       --         Physical Exam    Nursing note and vitals reviewed.  Constitutional: She appears well-developed and well-nourished.   HENT:   Head: Normocephalic and atraumatic.   Neck: Neck supple.   Normal range of motion.  Cardiovascular:  Normal rate, regular rhythm, normal heart sounds and intact distal pulses.           Pulmonary/Chest: Effort normal and breath sounds normal.   Abdominal: Abdomen is soft and flat. Bowel sounds are normal. There is no abdominal tenderness.   Musculoskeletal:         General: Normal range of motion.      Cervical back: Normal range of motion and neck supple.     Neurological: She is alert. She has normal strength.   Skin: Skin is warm and dry.   Psychiatric: She has a normal mood and affect.         ED Course   Procedures  Labs Reviewed   URINALYSIS, REFLEX TO URINE CULTURE - Abnormal; Notable for the following components:       Result Value    Bilirubin, UA 1+ (*)     Urobilinogen, UA 1+ (*)     Nitrites, UA 1+ (*)     Leukocyte Esterase, UA 75 (*)     Bacteria, UA Trace (*)     Squamous Epithelial Cells, UA Trace (*)     Mucous, UA Trace (*)     All other components within normal limits          Imaging Results    None          Medications - No data to display  Medical Decision Making  The patient presents with dysuria.  The onset was 1 week ago.  The course/duration of symptoms is constant.  Radiating pain: none. The character of symptoms is dull.  The degree at onset was minimal.  The degree at present is minimal.  The exacerbating factor is none.  The relieving factor is none.  Risk factors consist of none.  Therapy today: over the counter medications.  Associated symptoms: voiding small amounts, increased frequency, denies fever, denies chills, denies nausea, denies vomiting, denies abdominal pain, denies back pain, denies pelvic pain, denies hematuria and denies vaginal discharge. She is here  with her daughter.    6:30 PM DISPOSITION: The patient is resting comfortably in no acute distress.  She is hemodynamically stable and is without objective evidence for acute process requiring urgent intervention or hospitalization. I provided counseling to patient with regard to condition, the treatment plan, specific conditions for return, and the importance of follow up. Detailed written and verbal instructions provided to patient and she expressed a verbal understanding of the discharge instructions and overall management plan. Reiterated the importance of medication administration and safety and advised patient to follow up with primary care provider in 3-5 days or sooner if needed.  Answered questions at this time. The patient is stable for discharge.       Amount and/or Complexity of Data Reviewed  Labs:  Decision-making details documented in ED Course.    Risk  Prescription drug management.      Additional MDM:   Differential Diagnosis:   Urethritis, cystitis, pyelonephritis, herpes, candidiasis, among others              ED Course as of 01/21/24 1830   Sun Jan 21, 2024   1822 Urinalysis, Reflex to Urine Culture(!) [RB]   1822 Bilirubin, UA(!): 1+ [RB]   1822 Urobilinogen, UA(!): 1+ [RB]   1822 NITRITE UA(!): 1+ [RB]   1822 Leukocytes, UA(!): 75 [RB]   1822 Bacteria, UA(!): Trace [RB]   1822 Squamous Epithelial Cells, UA(!): Trace [RB]   1822 Mucous, UA(!): Trace [RB]      ED Course User Index  [RB] Dylan Lombardi ACNP                           Clinical Impression:  Final diagnoses:  [N30.00] Acute cystitis without hematuria (Primary)          ED Disposition Condition    Discharge Stable          ED Prescriptions       Medication Sig Dispense Start Date End Date Auth. Provider    nitrofurantoin, macrocrystal-monohydrate, (MACROBID) 100 MG capsule Take 1 capsule (100 mg total) by mouth 2 (two) times daily. for 7 days 14 capsule 1/21/2024 1/28/2024 Dylan Lombardi ACNP          Follow-up Information       Follow  up With Specialties Details Why Contact Info    follow up with your primary care provider in 3-5 days        [facility] Emergency Medicine  If symptoms worsen [address] [facility] [address] [phone]             Dylan Lombardi, ACNP  01/21/24 3624

## 2025-03-13 ENCOUNTER — HOSPITAL ENCOUNTER (EMERGENCY)
Facility: HOSPITAL | Age: 85
Discharge: HOME | End: 2025-03-13
Attending: EMERGENCY MEDICINE
Payer: MEDICARE

## 2025-03-13 VITALS
HEIGHT: 62 IN | RESPIRATION RATE: 19 BRPM | WEIGHT: 98.31 LBS | HEART RATE: 91 BPM | TEMPERATURE: 98 F | BODY MASS INDEX: 18.09 KG/M2 | SYSTOLIC BLOOD PRESSURE: 151 MMHG | DIASTOLIC BLOOD PRESSURE: 93 MMHG | OXYGEN SATURATION: 97 %
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
## 2025-03-13 DIAGNOSIS — L08.9: ICD-10-CM

## 2025-03-13 DIAGNOSIS — S81.851D: ICD-10-CM

## 2025-03-13 DIAGNOSIS — Z20.3 NEED FOR POST EXPOSURE PROPHYLAXIS FOR RABIES: Primary | ICD-10-CM

## 2025-03-13 DIAGNOSIS — S91.351D: ICD-10-CM

## 2025-03-13 LAB
ALBUMIN SERPL-MCNC: 3.3 G/DL (ref 3.4–4.8)
ALBUMIN/GLOB SERPL: 0.9 RATIO (ref 1.1–2)
ALP SERPL-CCNC: 64 UNIT/L (ref 40–150)
ALT SERPL-CCNC: 13 UNIT/L (ref 0–55)
ANION GAP SERPL CALC-SCNC: 9 MEQ/L
AST SERPL-CCNC: 21 UNIT/L (ref 5–34)
BASOPHILS # BLD AUTO: 0.02 X10(3)/MCL
BASOPHILS NFR BLD AUTO: 0.4 %
BILIRUB SERPL-MCNC: 0.4 MG/DL
BUN SERPL-MCNC: 21 MG/DL (ref 9.8–20.1)
CALCIUM SERPL-MCNC: 8.9 MG/DL (ref 8.4–10.2)
CHLORIDE SERPL-SCNC: 106 MMOL/L (ref 98–107)
CO2 SERPL-SCNC: 26 MMOL/L (ref 23–31)
CREAT SERPL-MCNC: 0.79 MG/DL (ref 0.55–1.02)
CREAT/UREA NIT SERPL: 27
EOSINOPHIL # BLD AUTO: 0.06 X10(3)/MCL (ref 0–0.9)
EOSINOPHIL NFR BLD AUTO: 1.3 %
ERYTHROCYTE [DISTWIDTH] IN BLOOD BY AUTOMATED COUNT: 13.3 % (ref 11.5–17)
GFR SERPLBLD CREATININE-BSD FMLA CKD-EPI: >60 ML/MIN/1.73/M2
GLOBULIN SER-MCNC: 3.6 GM/DL (ref 2.4–3.5)
GLUCOSE SERPL-MCNC: 97 MG/DL (ref 82–115)
HCT VFR BLD AUTO: 40.2 % (ref 37–47)
HGB BLD-MCNC: 13.3 G/DL (ref 12–16)
HOLD SPECIMEN: NORMAL
IMM GRANULOCYTES # BLD AUTO: 0.01 X10(3)/MCL (ref 0–0.04)
IMM GRANULOCYTES NFR BLD AUTO: 0.2 %
LYMPHOCYTES # BLD AUTO: 1.22 X10(3)/MCL (ref 0.6–4.6)
LYMPHOCYTES NFR BLD AUTO: 27.4 %
MCH RBC QN AUTO: 29.4 PG (ref 27–31)
MCHC RBC AUTO-ENTMCNC: 33.1 G/DL (ref 33–36)
MCV RBC AUTO: 88.7 FL (ref 80–94)
MONOCYTES # BLD AUTO: 0.38 X10(3)/MCL (ref 0.1–1.3)
MONOCYTES NFR BLD AUTO: 8.5 %
NEUTROPHILS # BLD AUTO: 2.77 X10(3)/MCL (ref 2.1–9.2)
NEUTROPHILS NFR BLD AUTO: 62.2 %
NRBC BLD AUTO-RTO: 0 %
PLATELET # BLD AUTO: 162 X10(3)/MCL (ref 130–400)
PMV BLD AUTO: 9.8 FL (ref 7.4–10.4)
POTASSIUM SERPL-SCNC: 4.1 MMOL/L (ref 3.5–5.1)
PROT SERPL-MCNC: 6.9 GM/DL (ref 5.8–7.6)
RBC # BLD AUTO: 4.53 X10(6)/MCL (ref 4.2–5.4)
SODIUM SERPL-SCNC: 141 MMOL/L (ref 136–145)
WBC # BLD AUTO: 4.46 X10(3)/MCL (ref 4.5–11.5)

## 2025-03-13 PROCEDURE — 85025 COMPLETE CBC W/AUTO DIFF WBC: CPT

## 2025-03-13 PROCEDURE — 80053 COMPREHEN METABOLIC PANEL: CPT

## 2025-03-13 PROCEDURE — 99284 EMERGENCY DEPT VISIT MOD MDM: CPT | Mod: 25

## 2025-03-13 PROCEDURE — 90375 RABIES IG IM/SC: CPT | Mod: JZ,TB

## 2025-03-13 PROCEDURE — 63600175 PHARM REV CODE 636 W HCPCS: Mod: JZ,TB

## 2025-03-13 PROCEDURE — 90675 RABIES VACCINE IM: CPT | Mod: JZ,TB

## 2025-03-13 PROCEDURE — 90471 IMMUNIZATION ADMIN: CPT

## 2025-03-13 RX ORDER — SULFAMETHOXAZOLE AND TRIMETHOPRIM 800; 160 MG/1; MG/1
1 TABLET ORAL EVERY 12 HOURS
Qty: 14 TABLET | Refills: 0 | Status: SHIPPED | OUTPATIENT
Start: 2025-03-13 | End: 2025-03-20

## 2025-03-13 RX ORDER — METRONIDAZOLE 500 MG/1
500 TABLET ORAL EVERY 8 HOURS
Qty: 21 TABLET | Refills: 0 | Status: SHIPPED | OUTPATIENT
Start: 2025-03-13 | End: 2025-03-20

## 2025-03-13 RX ADMIN — RABIES VACCINE 2.5 UNITS: KIT at 05:03

## 2025-03-13 RX ADMIN — RABIES IMMUNE GLOBULIN (HUMAN) 900 UNITS: 300 INJECTION, SOLUTION INFILTRATION; INTRAMUSCULAR at 05:03

## 2025-03-13 NOTE — DISCHARGE INSTRUCTIONS
STOP AUGMENTIN. START new antibiotics. RETURN if symptoms WORSEN, persist, or if you start running a FEVER.    RETURN TO ED OR INFUSION CENTER FOR REMAINING RABIES VACCINATIONS ON: 3/16, 3/20, 3/27    It is important that you follow up with your primary care provider or specialist if indicated for further evaluation, workup, and treatment as necessary. The exam and treatment you received in Emergency Department was for an urgent problem and NOT INTENDED AS COMPLETE CARE. It is important that you FOLLOW UP with a doctor for ongoing care. If your symptoms become WORSE or you DO NOT IMPROVE and you are unable to reach your health care provider, you should RETURN to the Emergency Department. The Emergency Department provider has provided a PRELIMINARY INTERPRETATION of all your studies. A final interpretation may be done after you are discharged. If a change in your diagnosis or treatment is needed WE WILL CONTACT YOU. It is critical that we have a CURRENT PHONE NUMBER FOR YOU.

## 2025-03-13 NOTE — ED PROVIDER NOTES
Encounter Date: 3/13/2025       History     Chief Complaint   Patient presents with    Animal Bite     Bitten by cat to right foot on Thursday or Friday. Seen at clinic in [place]. Placed on Augmentin yesterday.Took 2 doses. Was instructed to come back if no change in redness or swelling of foot. Tetanus received last night as well.      A 84 y.o. female patient with a history of HLD, IBS, asthma, depression presents to the ED with cat bite puncture wound to right foot. The onset is 6 days ago. Patient went to an urgent care last night and was given tdap and prescribed augmentin. Patient came to ED because it is swollen, red, and hot and she believes it is infected. Denies and purulent discharge or loss of ROM of foot/toes/ankle. Denies fever or any other symptoms. The cat was a street cat with unknown rabies status.       The history is provided by the patient.     Review of patient's allergies indicates:  No Known Allergies  Past Medical History:   Diagnosis Date    Asthma     Depression     Hyperlipidemia     IBS (irritable bowel syndrome)      Past Surgical History:   Procedure Laterality Date    BLADDER SUSPENSION      HYSTERECTOMY       Family History   Problem Relation Name Age of Onset    No Known Problems Mother      Cancer Father      Cancer Son       Social History[1]  Review of Systems   Constitutional:  Negative for chills and fever.   Eyes:  Negative for visual disturbance.   Respiratory:  Negative for shortness of breath.    Cardiovascular:  Negative for chest pain.   Gastrointestinal:  Negative for nausea and vomiting.   Genitourinary:  Negative for difficulty urinating and dysuria.   Musculoskeletal:  Negative for arthralgias.   Skin:  Positive for color change and wound.   Neurological:  Negative for weakness and headaches.   Hematological:  Does not bruise/bleed easily.   Psychiatric/Behavioral:  Negative for confusion.    All other systems reviewed and are negative.      Physical Exam      Initial Vitals [03/13/25 1606]   BP Pulse Resp Temp SpO2   (!) 151/93 91 20 97.7 °F (36.5 °C) 97 %      MAP       --         Physical Exam    Nursing note and vitals reviewed.  Constitutional: She appears well-developed and well-nourished.   HENT:   Head: Normocephalic and atraumatic.   Right Ear: External ear normal.   Left Ear: External ear normal.   Nose: Nose normal. Mouth/Throat: Oropharynx is clear and moist.   Eyes: Conjunctivae and EOM are normal.   Neck: Neck supple.   Cardiovascular:  Normal rate, regular rhythm and normal heart sounds.     Exam reveals no gallop and no friction rub.       No murmur heard.  Pulmonary/Chest: Breath sounds normal. No respiratory distress. She has no wheezes. She has no rhonchi. She has no rales.   Musculoskeletal:      Cervical back: Neck supple.      Right foot: Normal range of motion and normal capillary refill. Swelling and tenderness present. No deformity, laceration or bony tenderness. Normal pulse.        Legs:       Comments: Small puncture wound to right foot with surrounding swelling, erythema, and warmth. No loss of ROM. Patient states pain is only mild.      Neurological: She is alert and oriented to person, place, and time. GCS score is 15. GCS eye subscore is 4. GCS verbal subscore is 5. GCS motor subscore is 6.   Skin: Skin is warm and dry. Capillary refill takes less than 2 seconds.         ED Course   Procedures  Labs Reviewed   COMPREHENSIVE METABOLIC PANEL - Abnormal       Result Value    Sodium 141      Potassium 4.1      Chloride 106      CO2 26      Glucose 97      Blood Urea Nitrogen 21.0 (*)     Creatinine 0.79      Calcium 8.9      Protein Total 6.9      Albumin 3.3 (*)     Globulin 3.6 (*)     Albumin/Globulin Ratio 0.9 (*)     Bilirubin Total 0.4      ALP 64      ALT 13      AST 21      eGFR >60      Anion Gap 9.0      BUN/Creatinine Ratio 27     CBC WITH DIFFERENTIAL - Abnormal    WBC 4.46 (*)     RBC 4.53      Hgb 13.3      Hct 40.2      MCV  88.7      MCH 29.4      MCHC 33.1      RDW 13.3      Platelet 162      MPV 9.8      Neut % 62.2      Lymph % 27.4      Mono % 8.5      Eos % 1.3      Basophil % 0.4      Imm Grans % 0.2      Neut # 2.77      Lymph # 1.22      Mono # 0.38      Eos # 0.06      Baso # 0.02      Imm Gran # 0.01      NRBC% 0.0     CBC W/ AUTO DIFFERENTIAL    Narrative:     The following orders were created for panel order CBC Auto Differential.  Procedure                               Abnormality         Status                     ---------                               -----------         ------                     CBC with Differential[[id number]]        Abnormal            Final result                 Please view results for these tests on the individual orders.   EXTRA TUBES    Narrative:     The following orders were created for panel order EXTRA TUBES.  Procedure                               Abnormality         Status                     ---------                               -----------         ------                     Light Blue Top Hold[[id number]]                              In process                 Red Top Hold[[id number]]                                     In process                 Light Green Top Hold[[id number]]                            In process                 Lavender Top Hold[[id number]]                               In process                 Gold Top Hold[[id number]]                                   In process                   Please view results for these tests on the individual orders.   LIGHT BLUE TOP HOLD   RED TOP HOLD   LIGHT GREEN TOP HOLD   LAVENDER TOP HOLD   GOLD TOP HOLD          Imaging Results    None          Medications   rabies vaccine, PCEC injection 2.5 Units (2.5 Units Intramuscular Given 3/13/25 1722)   rabies immune globulin (PF) (HYPERRAB) injection 900 Units (900 Units Other Given 3/13/25 1713)     Medical Decision Making  A 84 y.o. female patient with a history of HLD, IBS, asthma,  depression presents to the ED with cat bite puncture wound to right foot. The onset is 6 days ago. Patient went to an urgent care last night and was given tdap and prescribed augmentin. Patient came to ED because it is swollen, red, and hot and she believes it is infected. Denies and purulent discharge or loss of ROM of foot/toes/ankle. Denies fever or any other symptoms. The cat was a street cat with unknown rabies status.       Labs unremarkable. Will treat patient with dual coverage antibiotics for cellulitis due to bite wound.     Patient given the following Medications  rabies vaccine, PCEC injection 2.5 Units (2.5 Units Intramuscular Given 3/13/25 1722)  rabies immune globulin (PF) (HYPERRAB) injection 900 Units (900 Units Other Given 3/13/25 1713)    Clinical impression:  Need for post exposure prophylaxis for rabies (Primary)  Cat bite of right foot, subsequent encounter  Infected animal bite of lower leg, right, subsequent encounter    Patient is non-toxic appearing and tolerating nutritional intake. Patient's vital signs and clinical condition are stable for DC with ED Prescriptions     Medication Sig Dispense Start Date End Date Auth. Provider    sulfamethoxazole-trimethoprim 800-160mg (BACTRIM DS) 800-160 mg Tab Take   1 tablet by mouth every 12 (twelve) hours. for 7 days 14 tablet 3/13/2025   3/20/2025 Alexandrea Puente PA    metroNIDAZOLE (FLAGYL) 500 MG tablet Take 1 tablet (500 mg total) by   mouth every 8 (eight) hours. for 7 days 21 tablet 3/13/2025 3/20/2025   Alexandrea Puente PA         Follow-up: PCP or Internal medicine clinic within 3 days  Referrals made: Infusion clinic for rabies vaccine in 3, 7, and 14 days.     Strict follow-up precautions given. Patient verbalizes understanding of treatment plan and ED return precautions.         Amount and/or Complexity of Data Reviewed  Labs: ordered. Decision-making details documented in ED Course.    Risk  Prescription drug management.  Risk  Details: Given strict ED return precautions. I have spoken with the patient and/or caregivers. I have explained the patient's condition, diagnoses and treatment plan based on the information available to me at this time. I have answered the patient's and/or caregiver's questions and addressed any concerns. The patient and/or caregivers have as good an understanding of the patient's diagnosis, condition and treatment plan as can be expected at this point. The patient's condition is stable and appropriate for discharge from the emergency department.      The patient will pursue further outpatient evaluation with the primary care physician or other designated or consulting physician as outlined in the discharge instructions. The patient and/or caregivers are agreeable to this plan of care and follow-up instructions have been explained in detail. The patient and/or caregivers have received these instructions in written format and have expressed an understanding of the discharge instructions. The patient and/or caregivers are aware that any significant change in condition or worsening of symptoms should prompt an immediate return to this or the closest emergency department or a call to 911.               Additional MDM:   Differential Diagnosis:   Other: The following diagnoses were also considered and will be evaluated: Contusion, Strain and Sprain.            ED Course as of 03/13/25 1816   Thu Mar 13, 2025   1742 WBC(!): 4.46 [AG]   1742 Hemoglobin: 13.3 [AG]   1803 eGFR: >60 [AG]   1804 Potassium: 4.1 [AG]      ED Course User Index  [AG] Alexandrea Puente PA                           Clinical Impression:  Final diagnoses:  [Z20.3] Need for post exposure prophylaxis for rabies (Primary)  [S91.351D, W55.01XD] Cat bite of right foot, subsequent encounter  [S81.851D, L08.9] Infected animal bite of lower leg, right, subsequent encounter          ED Disposition Condition    Discharge Stable          ED Prescriptions        Medication Sig Dispense Start Date End Date Auth. Provider    sulfamethoxazole-trimethoprim 800-160mg (BACTRIM DS) 800-160 mg Tab Take 1 tablet by mouth every 12 (twelve) hours. for 7 days 14 tablet 3/13/2025 3/20/2025 Alexandrea Puente PA    metroNIDAZOLE (FLAGYL) 500 MG tablet Take 1 tablet (500 mg total) by mouth every 8 (eight) hours. for 7 days 21 tablet 3/13/2025 3/20/2025 Alexandrea Puente PA          Follow-up Information       Follow up With Specialties Details Why Contact Info    [facility] Emergency Dept Emergency Medicine Go to  If symptoms worsen, As needed [address] [facility] [id number] [phone]    Infusion Center  Call  Rabies vaccination in 3 days, 7 days, and 14 days. Call to confirm appointment time during business hours. 7:30 am-4:30 pm, M-F.   Location: [address] [place] [address] & [facility]               [1]   Social History  Tobacco Use    Smoking status: Never    Smokeless tobacco: Never   Substance Use Topics    Alcohol use: Not Currently    Drug use: Never        Alexandrea Puente PA  03/13/25 9099

## 2025-03-14 ENCOUNTER — TELEPHONE (OUTPATIENT)
Dept: EMERGENCY MEDICINE | Facility: HOSPITAL | Age: 85
End: 2025-03-14
Payer: MEDICAID

## 2025-03-14 NOTE — TELEPHONE ENCOUNTER
Patient contacted, informed next Rabies Vaccine will be due  Sunday the 16 th and will be given in ED.  Day 7 and day 14 injections will be given in Infusion clinic, nurse from clinic will contact her with times. Patient voices understanding.

## 2025-03-17 ENCOUNTER — INFUSION (OUTPATIENT)
Dept: INFUSION THERAPY | Facility: HOSPITAL | Age: 85
End: 2025-03-17
Payer: MEDICARE

## 2025-03-17 VITALS
TEMPERATURE: 98 F | RESPIRATION RATE: 18 BRPM | SYSTOLIC BLOOD PRESSURE: 110 MMHG | BODY MASS INDEX: 18.09 KG/M2 | DIASTOLIC BLOOD PRESSURE: 76 MMHG | HEART RATE: 105 BPM | HEIGHT: 62 IN | WEIGHT: 98.31 LBS
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
## 2025-03-17 PROCEDURE — 63600175 PHARM REV CODE 636 W HCPCS: Mod: JZ,TB

## 2025-03-17 PROCEDURE — 90471 IMMUNIZATION ADMIN: CPT

## 2025-03-17 PROCEDURE — 96372 THER/PROPH/DIAG INJ SC/IM: CPT

## 2025-03-17 PROCEDURE — 90675 RABIES VACCINE IM: CPT | Mod: JZ,TB

## 2025-03-17 RX ADMIN — RABIES VACCINE 2.5 UNITS: KIT at 01:03

## 2025-03-20 ENCOUNTER — INFUSION (OUTPATIENT)
Dept: INFUSION THERAPY | Facility: HOSPITAL | Age: 85
End: 2025-03-20
Payer: MEDICARE

## 2025-03-20 VITALS
DIASTOLIC BLOOD PRESSURE: 87 MMHG | HEART RATE: 86 BPM | SYSTOLIC BLOOD PRESSURE: 117 MMHG | TEMPERATURE: 98 F | OXYGEN SATURATION: 98 %
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
## 2025-03-20 PROCEDURE — 90675 RABIES VACCINE IM: CPT | Mod: JZ,TB

## 2025-03-20 PROCEDURE — 63600175 PHARM REV CODE 636 W HCPCS: Mod: JZ,TB

## 2025-03-20 PROCEDURE — 96372 THER/PROPH/DIAG INJ SC/IM: CPT

## 2025-03-20 PROCEDURE — 90471 IMMUNIZATION ADMIN: CPT

## 2025-03-20 RX ADMIN — RABIES VACCINE 2.5 UNITS: KIT at 08:03

## 2025-03-20 NOTE — PLAN OF CARE
Pt received Rabbies vaccine #3/4, tolerated well. Next appts reviewed with pt/fly--states has a copy at home

## 2025-03-27 ENCOUNTER — INFUSION (OUTPATIENT)
Dept: INFUSION THERAPY | Facility: HOSPITAL | Age: 85
End: 2025-03-27
Payer: MEDICARE

## 2025-03-27 VITALS
OXYGEN SATURATION: 96 % | HEART RATE: 94 BPM | RESPIRATION RATE: 18 BRPM | DIASTOLIC BLOOD PRESSURE: 82 MMHG | SYSTOLIC BLOOD PRESSURE: 138 MMHG
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
## 2025-03-27 PROCEDURE — 90471 IMMUNIZATION ADMIN: CPT

## 2025-03-27 PROCEDURE — 90675 RABIES VACCINE IM: CPT | Mod: JZ,TB

## 2025-03-27 PROCEDURE — 96372 THER/PROPH/DIAG INJ SC/IM: CPT

## 2025-03-27 PROCEDURE — 63600175 PHARM REV CODE 636 W HCPCS: Mod: JZ,TB

## 2025-03-27 RX ADMIN — RABIES VACCINE 2.5 UNITS: KIT at 08:03

## 2025-03-27 NOTE — NURSING
Patient received Rabies injection. Advise to monitor 15 post injection. Patient refused. Left facility in stable condition.